# Patient Record
Sex: MALE | Race: WHITE | NOT HISPANIC OR LATINO | Employment: FULL TIME | URBAN - METROPOLITAN AREA
[De-identification: names, ages, dates, MRNs, and addresses within clinical notes are randomized per-mention and may not be internally consistent; named-entity substitution may affect disease eponyms.]

---

## 2021-04-30 ENCOUNTER — HOSPITAL ENCOUNTER (OUTPATIENT)
Dept: LAB | Facility: MEDICAL CENTER | Age: 61
End: 2021-04-30
Attending: FAMILY MEDICINE
Payer: COMMERCIAL

## 2021-04-30 LAB
ALBUMIN SERPL BCP-MCNC: 4.2 G/DL (ref 3.2–4.9)
ALBUMIN/GLOB SERPL: 1.4 G/DL
ALP SERPL-CCNC: 60 U/L (ref 30–99)
ALT SERPL-CCNC: 57 U/L (ref 2–50)
ANION GAP SERPL CALC-SCNC: 8 MMOL/L (ref 7–16)
AST SERPL-CCNC: 31 U/L (ref 12–45)
BASOPHILS # BLD AUTO: 0.8 % (ref 0–1.8)
BASOPHILS # BLD: 0.06 K/UL (ref 0–0.12)
BILIRUB SERPL-MCNC: 0.8 MG/DL (ref 0.1–1.5)
BUN SERPL-MCNC: 13 MG/DL (ref 8–22)
CALCIUM SERPL-MCNC: 9.4 MG/DL (ref 8.5–10.5)
CHLORIDE SERPL-SCNC: 102 MMOL/L (ref 96–112)
CHOLEST SERPL-MCNC: 212 MG/DL (ref 100–199)
CO2 SERPL-SCNC: 27 MMOL/L (ref 20–33)
CREAT SERPL-MCNC: 0.98 MG/DL (ref 0.5–1.4)
EOSINOPHIL # BLD AUTO: 0.2 K/UL (ref 0–0.51)
EOSINOPHIL NFR BLD: 2.6 % (ref 0–6.9)
ERYTHROCYTE [DISTWIDTH] IN BLOOD BY AUTOMATED COUNT: 42.6 FL (ref 35.9–50)
EST. AVERAGE GLUCOSE BLD GHB EST-MCNC: 137 MG/DL
FASTING STATUS PATIENT QL REPORTED: NORMAL
GLOBULIN SER CALC-MCNC: 3.1 G/DL (ref 1.9–3.5)
GLUCOSE SERPL-MCNC: 155 MG/DL (ref 65–99)
HBA1C MFR BLD: 6.4 % (ref 4–5.6)
HCT VFR BLD AUTO: 50.8 % (ref 42–52)
HDLC SERPL-MCNC: 44 MG/DL
HGB BLD-MCNC: 16.7 G/DL (ref 14–18)
IMM GRANULOCYTES # BLD AUTO: 0.08 K/UL (ref 0–0.11)
IMM GRANULOCYTES NFR BLD AUTO: 1 % (ref 0–0.9)
LDLC SERPL CALC-MCNC: 145 MG/DL
LYMPHOCYTES # BLD AUTO: 2.43 K/UL (ref 1–4.8)
LYMPHOCYTES NFR BLD: 31.4 % (ref 22–41)
MCH RBC QN AUTO: 29 PG (ref 27–33)
MCHC RBC AUTO-ENTMCNC: 32.9 G/DL (ref 33.7–35.3)
MCV RBC AUTO: 88.2 FL (ref 81.4–97.8)
MONOCYTES # BLD AUTO: 0.73 K/UL (ref 0–0.85)
MONOCYTES NFR BLD AUTO: 9.4 % (ref 0–13.4)
NEUTROPHILS # BLD AUTO: 4.24 K/UL (ref 1.82–7.42)
NEUTROPHILS NFR BLD: 54.8 % (ref 44–72)
NRBC # BLD AUTO: 0 K/UL
NRBC BLD-RTO: 0 /100 WBC
PLATELET # BLD AUTO: 282 K/UL (ref 164–446)
PMV BLD AUTO: 10.5 FL (ref 9–12.9)
POTASSIUM SERPL-SCNC: 4.6 MMOL/L (ref 3.6–5.5)
PROT SERPL-MCNC: 7.3 G/DL (ref 6–8.2)
PSA SERPL-MCNC: 0.57 NG/ML (ref 0–4)
RBC # BLD AUTO: 5.76 M/UL (ref 4.7–6.1)
SODIUM SERPL-SCNC: 137 MMOL/L (ref 135–145)
TRIGL SERPL-MCNC: 113 MG/DL (ref 0–149)
TSH SERPL DL<=0.005 MIU/L-ACNC: 2.04 UIU/ML (ref 0.38–5.33)
WBC # BLD AUTO: 7.7 K/UL (ref 4.8–10.8)

## 2021-04-30 PROCEDURE — 80061 LIPID PANEL: CPT

## 2021-04-30 PROCEDURE — 83036 HEMOGLOBIN GLYCOSYLATED A1C: CPT

## 2021-04-30 PROCEDURE — 84443 ASSAY THYROID STIM HORMONE: CPT

## 2021-04-30 PROCEDURE — 84153 ASSAY OF PSA TOTAL: CPT

## 2021-04-30 PROCEDURE — 80053 COMPREHEN METABOLIC PANEL: CPT

## 2021-04-30 PROCEDURE — 36415 COLL VENOUS BLD VENIPUNCTURE: CPT

## 2021-04-30 PROCEDURE — 85025 COMPLETE CBC W/AUTO DIFF WBC: CPT

## 2021-07-26 ENCOUNTER — HOSPITAL ENCOUNTER (OUTPATIENT)
Dept: RADIOLOGY | Facility: MEDICAL CENTER | Age: 61
End: 2021-07-26
Attending: INTERNAL MEDICINE
Payer: COMMERCIAL

## 2021-07-26 DIAGNOSIS — Z12.11 COLON CANCER SCREENING: ICD-10-CM

## 2021-07-26 PROCEDURE — 700117 HCHG RX CONTRAST REV CODE 255: Performed by: INTERNAL MEDICINE

## 2021-07-26 PROCEDURE — 72197 MRI PELVIS W/O & W/DYE: CPT

## 2021-07-26 PROCEDURE — A9576 INJ PROHANCE MULTIPACK: HCPCS | Performed by: INTERNAL MEDICINE

## 2021-07-26 RX ADMIN — GADOTERIDOL 20 ML: 279.3 INJECTION, SOLUTION INTRAVENOUS at 16:37

## 2021-08-04 ENCOUNTER — HOSPITAL ENCOUNTER (OUTPATIENT)
Dept: LAB | Facility: MEDICAL CENTER | Age: 61
End: 2021-08-04
Attending: SURGERY
Payer: COMMERCIAL

## 2021-08-04 LAB
ALBUMIN SERPL BCP-MCNC: 4.5 G/DL (ref 3.2–4.9)
ALBUMIN/GLOB SERPL: 1.5 G/DL
ALP SERPL-CCNC: 63 U/L (ref 30–99)
ALT SERPL-CCNC: 25 U/L (ref 2–50)
ANION GAP SERPL CALC-SCNC: 13 MMOL/L (ref 7–16)
AST SERPL-CCNC: 17 U/L (ref 12–45)
BASOPHILS # BLD AUTO: 0.7 % (ref 0–1.8)
BASOPHILS # BLD: 0.05 K/UL (ref 0–0.12)
BILIRUB SERPL-MCNC: 0.7 MG/DL (ref 0.1–1.5)
BUN SERPL-MCNC: 12 MG/DL (ref 8–22)
CALCIUM SERPL-MCNC: 10 MG/DL (ref 8.5–10.5)
CEA SERPL-MCNC: 2.9 NG/ML (ref 0–3)
CHLORIDE SERPL-SCNC: 103 MMOL/L (ref 96–112)
CO2 SERPL-SCNC: 24 MMOL/L (ref 20–33)
CREAT SERPL-MCNC: 1.05 MG/DL (ref 0.5–1.4)
EOSINOPHIL # BLD AUTO: 0.14 K/UL (ref 0–0.51)
EOSINOPHIL NFR BLD: 2 % (ref 0–6.9)
ERYTHROCYTE [DISTWIDTH] IN BLOOD BY AUTOMATED COUNT: 43.6 FL (ref 35.9–50)
FASTING STATUS PATIENT QL REPORTED: NORMAL
GLOBULIN SER CALC-MCNC: 3 G/DL (ref 1.9–3.5)
GLUCOSE SERPL-MCNC: 139 MG/DL (ref 65–99)
HCT VFR BLD AUTO: 50.1 % (ref 42–52)
HGB BLD-MCNC: 16.5 G/DL (ref 14–18)
IMM GRANULOCYTES # BLD AUTO: 0.04 K/UL (ref 0–0.11)
IMM GRANULOCYTES NFR BLD AUTO: 0.6 % (ref 0–0.9)
LYMPHOCYTES # BLD AUTO: 1.7 K/UL (ref 1–4.8)
LYMPHOCYTES NFR BLD: 24.1 % (ref 22–41)
MCH RBC QN AUTO: 28.9 PG (ref 27–33)
MCHC RBC AUTO-ENTMCNC: 32.9 G/DL (ref 33.7–35.3)
MCV RBC AUTO: 87.7 FL (ref 81.4–97.8)
MONOCYTES # BLD AUTO: 0.66 K/UL (ref 0–0.85)
MONOCYTES NFR BLD AUTO: 9.4 % (ref 0–13.4)
NEUTROPHILS # BLD AUTO: 4.45 K/UL (ref 1.82–7.42)
NEUTROPHILS NFR BLD: 63.2 % (ref 44–72)
NRBC # BLD AUTO: 0 K/UL
NRBC BLD-RTO: 0 /100 WBC
PLATELET # BLD AUTO: 279 K/UL (ref 164–446)
PMV BLD AUTO: 10.1 FL (ref 9–12.9)
POTASSIUM SERPL-SCNC: 4.8 MMOL/L (ref 3.6–5.5)
PROT SERPL-MCNC: 7.5 G/DL (ref 6–8.2)
RBC # BLD AUTO: 5.71 M/UL (ref 4.7–6.1)
SODIUM SERPL-SCNC: 140 MMOL/L (ref 135–145)
WBC # BLD AUTO: 7 K/UL (ref 4.8–10.8)

## 2021-08-04 PROCEDURE — 85025 COMPLETE CBC W/AUTO DIFF WBC: CPT

## 2021-08-04 PROCEDURE — 82378 CARCINOEMBRYONIC ANTIGEN: CPT

## 2021-08-04 PROCEDURE — 36415 COLL VENOUS BLD VENIPUNCTURE: CPT

## 2021-08-04 PROCEDURE — 80053 COMPREHEN METABOLIC PANEL: CPT

## 2021-08-07 ENCOUNTER — HOSPITAL ENCOUNTER (OUTPATIENT)
Dept: RADIOLOGY | Facility: MEDICAL CENTER | Age: 61
End: 2021-08-07
Attending: SURGERY
Payer: COMMERCIAL

## 2021-08-07 DIAGNOSIS — C18.9 MALIGNANT NEOPLASM OF COLON, UNSPECIFIED PART OF COLON (HCC): ICD-10-CM

## 2021-08-07 PROCEDURE — 700117 HCHG RX CONTRAST REV CODE 255: Performed by: SURGERY

## 2021-08-07 PROCEDURE — 71260 CT THORAX DX C+: CPT

## 2021-08-07 RX ADMIN — IOHEXOL 25 ML: 240 INJECTION, SOLUTION INTRATHECAL; INTRAVASCULAR; INTRAVENOUS; ORAL at 14:44

## 2021-08-07 RX ADMIN — IOHEXOL 100 ML: 350 INJECTION, SOLUTION INTRAVENOUS at 14:44

## 2021-08-20 ENCOUNTER — APPOINTMENT (OUTPATIENT)
Dept: RADIATION ONCOLOGY | Facility: MEDICAL CENTER | Age: 61
End: 2021-08-20
Attending: RADIOLOGY
Payer: COMMERCIAL

## 2021-09-08 ENCOUNTER — PRE-ADMISSION TESTING (OUTPATIENT)
Dept: ADMISSIONS | Facility: MEDICAL CENTER | Age: 61
DRG: 331 | End: 2021-09-08
Attending: SURGERY
Payer: COMMERCIAL

## 2021-09-08 DIAGNOSIS — Z01.810 PRE-OPERATIVE CARDIOVASCULAR EXAMINATION: ICD-10-CM

## 2021-09-08 DIAGNOSIS — Z01.812 PRE-OPERATIVE LABORATORY EXAMINATION: ICD-10-CM

## 2021-09-08 LAB
ANION GAP SERPL CALC-SCNC: 13 MMOL/L (ref 7–16)
BUN SERPL-MCNC: 12 MG/DL (ref 8–22)
CALCIUM SERPL-MCNC: 9.9 MG/DL (ref 8.5–10.5)
CHLORIDE SERPL-SCNC: 102 MMOL/L (ref 96–112)
CO2 SERPL-SCNC: 24 MMOL/L (ref 20–33)
CREAT SERPL-MCNC: 0.89 MG/DL (ref 0.5–1.4)
EKG IMPRESSION: NORMAL
ERYTHROCYTE [DISTWIDTH] IN BLOOD BY AUTOMATED COUNT: 41.8 FL (ref 35.9–50)
GLUCOSE SERPL-MCNC: 119 MG/DL (ref 65–99)
HCT VFR BLD AUTO: 49.3 % (ref 42–52)
HGB BLD-MCNC: 16.5 G/DL (ref 14–18)
MCH RBC QN AUTO: 28.5 PG (ref 27–33)
MCHC RBC AUTO-ENTMCNC: 33.5 G/DL (ref 33.7–35.3)
MCV RBC AUTO: 85.1 FL (ref 81.4–97.8)
PLATELET # BLD AUTO: 288 K/UL (ref 164–446)
PMV BLD AUTO: 9.8 FL (ref 9–12.9)
POTASSIUM SERPL-SCNC: 4.8 MMOL/L (ref 3.6–5.5)
RBC # BLD AUTO: 5.79 M/UL (ref 4.7–6.1)
SODIUM SERPL-SCNC: 139 MMOL/L (ref 135–145)
WBC # BLD AUTO: 7.8 K/UL (ref 4.8–10.8)

## 2021-09-08 PROCEDURE — 93005 ELECTROCARDIOGRAM TRACING: CPT

## 2021-09-08 PROCEDURE — 80048 BASIC METABOLIC PNL TOTAL CA: CPT

## 2021-09-08 PROCEDURE — 93010 ELECTROCARDIOGRAM REPORT: CPT | Performed by: INTERNAL MEDICINE

## 2021-09-08 PROCEDURE — 36415 COLL VENOUS BLD VENIPUNCTURE: CPT

## 2021-09-08 PROCEDURE — 85027 COMPLETE CBC AUTOMATED: CPT

## 2021-09-08 RX ORDER — MULTIVIT-MIN/IRON/FOLIC ACID/K 18-600-40
2000 CAPSULE ORAL EVERY MORNING
COMMUNITY

## 2021-09-08 ASSESSMENT — FIBROSIS 4 INDEX: FIB4 SCORE: 0.74

## 2021-09-17 ENCOUNTER — PRE-ADMISSION TESTING (OUTPATIENT)
Dept: ADMISSIONS | Facility: MEDICAL CENTER | Age: 61
DRG: 331 | End: 2021-09-17
Attending: SURGERY
Payer: COMMERCIAL

## 2021-09-17 DIAGNOSIS — Z01.812 PRE-OPERATIVE LABORATORY EXAMINATION: ICD-10-CM

## 2021-09-17 LAB — COVID ORDER STATUS COVID19: NORMAL

## 2021-09-17 PROCEDURE — U0003 INFECTIOUS AGENT DETECTION BY NUCLEIC ACID (DNA OR RNA); SEVERE ACUTE RESPIRATORY SYNDROME CORONAVIRUS 2 (SARS-COV-2) (CORONAVIRUS DISEASE [COVID-19]), AMPLIFIED PROBE TECHNIQUE, MAKING USE OF HIGH THROUGHPUT TECHNOLOGIES AS DESCRIBED BY CMS-2020-01-R: HCPCS

## 2021-09-17 PROCEDURE — U0005 INFEC AGEN DETEC AMPLI PROBE: HCPCS

## 2021-09-19 LAB
SARS-COV-2 RNA RESP QL NAA+PROBE: NOTDETECTED
SPECIMEN SOURCE: NORMAL

## 2021-09-20 PROBLEM — C19 RECTOSIGMOID CANCER (HCC): Chronic | Status: ACTIVE | Noted: 2021-09-20

## 2021-09-21 ENCOUNTER — ANESTHESIA (OUTPATIENT)
Dept: SURGERY | Facility: MEDICAL CENTER | Age: 61
DRG: 331 | End: 2021-09-21
Payer: COMMERCIAL

## 2021-09-21 ENCOUNTER — ANESTHESIA EVENT (OUTPATIENT)
Dept: SURGERY | Facility: MEDICAL CENTER | Age: 61
DRG: 331 | End: 2021-09-21
Payer: COMMERCIAL

## 2021-09-21 ENCOUNTER — HOSPITAL ENCOUNTER (INPATIENT)
Facility: MEDICAL CENTER | Age: 61
LOS: 4 days | DRG: 331 | End: 2021-09-25
Attending: SURGERY | Admitting: SURGERY
Payer: COMMERCIAL

## 2021-09-21 DIAGNOSIS — G89.18 POST-OP PAIN: ICD-10-CM

## 2021-09-21 DIAGNOSIS — Z43.2 ILEOSTOMY CARE (HCC): ICD-10-CM

## 2021-09-21 DIAGNOSIS — C19 RECTOSIGMOID CANCER (HCC): Chronic | ICD-10-CM

## 2021-09-21 LAB — GLUCOSE BLD-MCNC: 125 MG/DL (ref 65–99)

## 2021-09-21 PROCEDURE — 07BC4ZX EXCISION OF PELVIS LYMPHATIC, PERCUTANEOUS ENDOSCOPIC APPROACH, DIAGNOSTIC: ICD-10-PCS | Performed by: SURGERY

## 2021-09-21 PROCEDURE — 0DTN4ZZ RESECTION OF SIGMOID COLON, PERCUTANEOUS ENDOSCOPIC APPROACH: ICD-10-PCS | Performed by: SURGERY

## 2021-09-21 PROCEDURE — 160042 HCHG SURGERY MINUTES - EA ADDL 1 MIN LEVEL 5: Performed by: SURGERY

## 2021-09-21 PROCEDURE — 88307 TISSUE EXAM BY PATHOLOGIST: CPT

## 2021-09-21 PROCEDURE — 160009 HCHG ANES TIME/MIN: Performed by: SURGERY

## 2021-09-21 PROCEDURE — 502714 HCHG ROBOTIC SURGERY SERVICES: Performed by: SURGERY

## 2021-09-21 PROCEDURE — 160002 HCHG RECOVERY MINUTES (STAT): Performed by: SURGERY

## 2021-09-21 PROCEDURE — 770006 HCHG ROOM/CARE - MED/SURG/GYN SEMI*

## 2021-09-21 PROCEDURE — 0D1B0Z4 BYPASS ILEUM TO CUTANEOUS, OPEN APPROACH: ICD-10-PCS | Performed by: SURGERY

## 2021-09-21 PROCEDURE — 82962 GLUCOSE BLOOD TEST: CPT

## 2021-09-21 PROCEDURE — 700105 HCHG RX REV CODE 258: Performed by: SURGERY

## 2021-09-21 PROCEDURE — A9270 NON-COVERED ITEM OR SERVICE: HCPCS | Performed by: ANESTHESIOLOGY

## 2021-09-21 PROCEDURE — 0DTP4ZZ RESECTION OF RECTUM, PERCUTANEOUS ENDOSCOPIC APPROACH: ICD-10-PCS | Performed by: SURGERY

## 2021-09-21 PROCEDURE — 700102 HCHG RX REV CODE 250 W/ 637 OVERRIDE(OP): Performed by: ANESTHESIOLOGY

## 2021-09-21 PROCEDURE — 700102 HCHG RX REV CODE 250 W/ 637 OVERRIDE(OP): Performed by: NURSE PRACTITIONER

## 2021-09-21 PROCEDURE — 160031 HCHG SURGERY MINUTES - 1ST 30 MINS LEVEL 5: Performed by: SURGERY

## 2021-09-21 PROCEDURE — 700104 HCHG RX REV CODE 254: Performed by: ANESTHESIOLOGY

## 2021-09-21 PROCEDURE — 160035 HCHG PACU - 1ST 60 MINS PHASE I: Performed by: SURGERY

## 2021-09-21 PROCEDURE — 8E0W4CZ ROBOTIC ASSISTED PROCEDURE OF TRUNK REGION, PERCUTANEOUS ENDOSCOPIC APPROACH: ICD-10-PCS | Performed by: SURGERY

## 2021-09-21 PROCEDURE — 88304 TISSUE EXAM BY PATHOLOGIST: CPT

## 2021-09-21 PROCEDURE — A9270 NON-COVERED ITEM OR SERVICE: HCPCS | Performed by: NURSE PRACTITIONER

## 2021-09-21 PROCEDURE — 64488 TAP BLOCK BI INJECTION: CPT | Performed by: SURGERY

## 2021-09-21 PROCEDURE — 700111 HCHG RX REV CODE 636 W/ 250 OVERRIDE (IP): Performed by: ANESTHESIOLOGY

## 2021-09-21 PROCEDURE — 500380 HCHG DRAIN, PENROSE 1/4X12: Performed by: SURGERY

## 2021-09-21 PROCEDURE — 501570 HCHG TROCAR, SEPARATOR: Performed by: SURGERY

## 2021-09-21 PROCEDURE — 700101 HCHG RX REV CODE 250: Performed by: ANESTHESIOLOGY

## 2021-09-21 PROCEDURE — C1765 ADHESION BARRIER: HCPCS | Performed by: SURGERY

## 2021-09-21 PROCEDURE — 700105 HCHG RX REV CODE 258: Performed by: NURSE PRACTITIONER

## 2021-09-21 PROCEDURE — 160048 HCHG OR STATISTICAL LEVEL 1-5: Performed by: SURGERY

## 2021-09-21 PROCEDURE — 500378 HCHG DRAIN, J-VAC ROUND 19FR: Performed by: SURGERY

## 2021-09-21 PROCEDURE — 501838 HCHG SUTURE GENERAL: Performed by: SURGERY

## 2021-09-21 RX ORDER — HYDROMORPHONE HYDROCHLORIDE 1 MG/ML
0.1 INJECTION, SOLUTION INTRAMUSCULAR; INTRAVENOUS; SUBCUTANEOUS
Status: DISCONTINUED | OUTPATIENT
Start: 2021-09-21 | End: 2021-09-21 | Stop reason: HOSPADM

## 2021-09-21 RX ORDER — ACETAMINOPHEN 500 MG
1000 TABLET ORAL ONCE
Status: COMPLETED | OUTPATIENT
Start: 2021-09-21 | End: 2021-09-21

## 2021-09-21 RX ORDER — ONDANSETRON 2 MG/ML
INJECTION INTRAMUSCULAR; INTRAVENOUS PRN
Status: DISCONTINUED | OUTPATIENT
Start: 2021-09-21 | End: 2021-09-21 | Stop reason: SURG

## 2021-09-21 RX ORDER — DIPHENHYDRAMINE HYDROCHLORIDE 50 MG/ML
25 INJECTION INTRAMUSCULAR; INTRAVENOUS EVERY 6 HOURS PRN
Status: DISCONTINUED | OUTPATIENT
Start: 2021-09-21 | End: 2021-09-25 | Stop reason: HOSPADM

## 2021-09-21 RX ORDER — OXYCODONE HYDROCHLORIDE 5 MG/1
5 TABLET ORAL
Status: DISCONTINUED | OUTPATIENT
Start: 2021-09-21 | End: 2021-09-25 | Stop reason: HOSPADM

## 2021-09-21 RX ORDER — ACETAMINOPHEN 500 MG
1000 TABLET ORAL EVERY 6 HOURS PRN
Status: DISCONTINUED | OUTPATIENT
Start: 2021-09-26 | End: 2021-09-25 | Stop reason: HOSPADM

## 2021-09-21 RX ORDER — SODIUM CHLORIDE, SODIUM LACTATE, POTASSIUM CHLORIDE, CALCIUM CHLORIDE 600; 310; 30; 20 MG/100ML; MG/100ML; MG/100ML; MG/100ML
INJECTION, SOLUTION INTRAVENOUS
Status: COMPLETED | OUTPATIENT
Start: 2021-09-21 | End: 2021-09-21

## 2021-09-21 RX ORDER — LIDOCAINE HYDROCHLORIDE 20 MG/ML
INJECTION, SOLUTION EPIDURAL; INFILTRATION; INTRACAUDAL; PERINEURAL PRN
Status: DISCONTINUED | OUTPATIENT
Start: 2021-09-21 | End: 2021-09-21 | Stop reason: SURG

## 2021-09-21 RX ORDER — HALOPERIDOL 5 MG/ML
1 INJECTION INTRAMUSCULAR EVERY 6 HOURS PRN
Status: DISCONTINUED | OUTPATIENT
Start: 2021-09-21 | End: 2021-09-25 | Stop reason: HOSPADM

## 2021-09-21 RX ORDER — HYDROMORPHONE HYDROCHLORIDE 1 MG/ML
0.5 INJECTION, SOLUTION INTRAMUSCULAR; INTRAVENOUS; SUBCUTANEOUS
Status: DISCONTINUED | OUTPATIENT
Start: 2021-09-21 | End: 2021-09-25 | Stop reason: HOSPADM

## 2021-09-21 RX ORDER — MEPERIDINE HYDROCHLORIDE 25 MG/ML
6.25 INJECTION INTRAMUSCULAR; INTRAVENOUS; SUBCUTANEOUS
Status: DISCONTINUED | OUTPATIENT
Start: 2021-09-21 | End: 2021-09-21 | Stop reason: HOSPADM

## 2021-09-21 RX ORDER — HYDROMORPHONE HYDROCHLORIDE 1 MG/ML
0.2 INJECTION, SOLUTION INTRAMUSCULAR; INTRAVENOUS; SUBCUTANEOUS
Status: DISCONTINUED | OUTPATIENT
Start: 2021-09-21 | End: 2021-09-21 | Stop reason: HOSPADM

## 2021-09-21 RX ORDER — DEXTROSE, SODIUM CHLORIDE, SODIUM LACTATE, POTASSIUM CHLORIDE, AND CALCIUM CHLORIDE 5; .6; .31; .03; .02 G/100ML; G/100ML; G/100ML; G/100ML; G/100ML
INJECTION, SOLUTION INTRAVENOUS CONTINUOUS
Status: ACTIVE | OUTPATIENT
Start: 2021-09-21 | End: 2021-09-22

## 2021-09-21 RX ORDER — HYDROMORPHONE HYDROCHLORIDE 2 MG/ML
INJECTION, SOLUTION INTRAMUSCULAR; INTRAVENOUS; SUBCUTANEOUS PRN
Status: DISCONTINUED | OUTPATIENT
Start: 2021-09-21 | End: 2021-09-21 | Stop reason: SURG

## 2021-09-21 RX ORDER — HYDROMORPHONE HYDROCHLORIDE 1 MG/ML
0.4 INJECTION, SOLUTION INTRAMUSCULAR; INTRAVENOUS; SUBCUTANEOUS
Status: DISCONTINUED | OUTPATIENT
Start: 2021-09-21 | End: 2021-09-21 | Stop reason: HOSPADM

## 2021-09-21 RX ORDER — OMEPRAZOLE 20 MG/1
20 CAPSULE, DELAYED RELEASE ORAL EVERY MORNING
COMMUNITY

## 2021-09-21 RX ORDER — NEOMYCIN SULFATE 500 MG/1
1000 TABLET ORAL 3 TIMES DAILY
Status: ON HOLD | COMMUNITY
End: 2021-09-25

## 2021-09-21 RX ORDER — METOCLOPRAMIDE HYDROCHLORIDE 5 MG/ML
INJECTION INTRAMUSCULAR; INTRAVENOUS PRN
Status: DISCONTINUED | OUTPATIENT
Start: 2021-09-21 | End: 2021-09-21 | Stop reason: SURG

## 2021-09-21 RX ORDER — CALCIUM CARBONATE 500 MG/1
500 TABLET, CHEWABLE ORAL
Status: DISCONTINUED | OUTPATIENT
Start: 2021-09-21 | End: 2021-09-25 | Stop reason: HOSPADM

## 2021-09-21 RX ORDER — SODIUM CHLORIDE, SODIUM LACTATE, POTASSIUM CHLORIDE, CALCIUM CHLORIDE 600; 310; 30; 20 MG/100ML; MG/100ML; MG/100ML; MG/100ML
INJECTION, SOLUTION INTRAVENOUS CONTINUOUS
Status: ACTIVE | OUTPATIENT
Start: 2021-09-21 | End: 2021-09-21

## 2021-09-21 RX ORDER — METOPROLOL TARTRATE 1 MG/ML
1 INJECTION, SOLUTION INTRAVENOUS
Status: DISCONTINUED | OUTPATIENT
Start: 2021-09-21 | End: 2021-09-21 | Stop reason: HOSPADM

## 2021-09-21 RX ORDER — ONDANSETRON 2 MG/ML
4 INJECTION INTRAMUSCULAR; INTRAVENOUS EVERY 4 HOURS PRN
Status: DISCONTINUED | OUTPATIENT
Start: 2021-09-21 | End: 2021-09-25 | Stop reason: HOSPADM

## 2021-09-21 RX ORDER — OXYCODONE HYDROCHLORIDE 10 MG/1
10 TABLET ORAL
Status: DISCONTINUED | OUTPATIENT
Start: 2021-09-21 | End: 2021-09-25 | Stop reason: HOSPADM

## 2021-09-21 RX ORDER — HYDRALAZINE HYDROCHLORIDE 20 MG/ML
5 INJECTION INTRAMUSCULAR; INTRAVENOUS
Status: DISCONTINUED | OUTPATIENT
Start: 2021-09-21 | End: 2021-09-21 | Stop reason: HOSPADM

## 2021-09-21 RX ORDER — BUPIVACAINE HYDROCHLORIDE AND EPINEPHRINE 2.5; 5 MG/ML; UG/ML
INJECTION, SOLUTION EPIDURAL; INFILTRATION; INTRACAUDAL; PERINEURAL PRN
Status: DISCONTINUED | OUTPATIENT
Start: 2021-09-21 | End: 2021-09-21 | Stop reason: SURG

## 2021-09-21 RX ORDER — ROCURONIUM BROMIDE 10 MG/ML
INJECTION, SOLUTION INTRAVENOUS PRN
Status: DISCONTINUED | OUTPATIENT
Start: 2021-09-21 | End: 2021-09-21 | Stop reason: SURG

## 2021-09-21 RX ORDER — HALOPERIDOL 5 MG/ML
1 INJECTION INTRAMUSCULAR
Status: DISCONTINUED | OUTPATIENT
Start: 2021-09-21 | End: 2021-09-21 | Stop reason: HOSPADM

## 2021-09-21 RX ORDER — LORATADINE 10 MG/1
10 TABLET ORAL
COMMUNITY

## 2021-09-21 RX ORDER — SUCCINYLCHOLINE CHLORIDE 20 MG/ML
INJECTION INTRAMUSCULAR; INTRAVENOUS PRN
Status: DISCONTINUED | OUTPATIENT
Start: 2021-09-21 | End: 2021-09-21 | Stop reason: SURG

## 2021-09-21 RX ORDER — SODIUM CHLORIDE, SODIUM LACTATE, POTASSIUM CHLORIDE, CALCIUM CHLORIDE 600; 310; 30; 20 MG/100ML; MG/100ML; MG/100ML; MG/100ML
INJECTION, SOLUTION INTRAVENOUS CONTINUOUS
Status: DISCONTINUED | OUTPATIENT
Start: 2021-09-21 | End: 2021-09-21 | Stop reason: HOSPADM

## 2021-09-21 RX ORDER — MIDAZOLAM HYDROCHLORIDE 1 MG/ML
INJECTION INTRAMUSCULAR; INTRAVENOUS PRN
Status: DISCONTINUED | OUTPATIENT
Start: 2021-09-21 | End: 2021-09-21 | Stop reason: SURG

## 2021-09-21 RX ORDER — DIPHENHYDRAMINE HYDROCHLORIDE 50 MG/ML
12.5 INJECTION INTRAMUSCULAR; INTRAVENOUS
Status: DISCONTINUED | OUTPATIENT
Start: 2021-09-21 | End: 2021-09-21 | Stop reason: HOSPADM

## 2021-09-21 RX ORDER — OMEPRAZOLE 20 MG/1
20 CAPSULE, DELAYED RELEASE ORAL DAILY
Status: DISCONTINUED | OUTPATIENT
Start: 2021-09-22 | End: 2021-09-25 | Stop reason: HOSPADM

## 2021-09-21 RX ORDER — GABAPENTIN 300 MG/1
300 CAPSULE ORAL ONCE
Status: COMPLETED | OUTPATIENT
Start: 2021-09-21 | End: 2021-09-21

## 2021-09-21 RX ORDER — TRAZODONE HYDROCHLORIDE 50 MG/1
50 TABLET ORAL NIGHTLY PRN
Status: DISCONTINUED | OUTPATIENT
Start: 2021-09-21 | End: 2021-09-25 | Stop reason: HOSPADM

## 2021-09-21 RX ORDER — METOPROLOL TARTRATE 1 MG/ML
INJECTION, SOLUTION INTRAVENOUS PRN
Status: DISCONTINUED | OUTPATIENT
Start: 2021-09-21 | End: 2021-09-21 | Stop reason: SURG

## 2021-09-21 RX ORDER — INDOCYANINE GREEN AND WATER 25 MG
KIT INJECTION PRN
Status: DISCONTINUED | OUTPATIENT
Start: 2021-09-21 | End: 2021-09-21 | Stop reason: SURG

## 2021-09-21 RX ORDER — DIPHENHYDRAMINE HCL 25 MG
25 TABLET ORAL EVERY 6 HOURS PRN
Status: DISCONTINUED | OUTPATIENT
Start: 2021-09-21 | End: 2021-09-25 | Stop reason: HOSPADM

## 2021-09-21 RX ORDER — ONDANSETRON 2 MG/ML
4 INJECTION INTRAMUSCULAR; INTRAVENOUS
Status: DISCONTINUED | OUTPATIENT
Start: 2021-09-21 | End: 2021-09-21 | Stop reason: HOSPADM

## 2021-09-21 RX ORDER — ACETAMINOPHEN 500 MG
1000 TABLET ORAL EVERY 6 HOURS
Status: DISCONTINUED | OUTPATIENT
Start: 2021-09-21 | End: 2021-09-25 | Stop reason: HOSPADM

## 2021-09-21 RX ORDER — LORATADINE 10 MG/1
10 TABLET ORAL DAILY
Status: DISCONTINUED | OUTPATIENT
Start: 2021-09-22 | End: 2021-09-25 | Stop reason: HOSPADM

## 2021-09-21 RX ORDER — OXYCODONE HCL 5 MG/5 ML
10 SOLUTION, ORAL ORAL
Status: COMPLETED | OUTPATIENT
Start: 2021-09-21 | End: 2021-09-21

## 2021-09-21 RX ORDER — DEXAMETHASONE SODIUM PHOSPHATE 4 MG/ML
INJECTION, SOLUTION INTRA-ARTICULAR; INTRALESIONAL; INTRAMUSCULAR; INTRAVENOUS; SOFT TISSUE PRN
Status: DISCONTINUED | OUTPATIENT
Start: 2021-09-21 | End: 2021-09-21 | Stop reason: SURG

## 2021-09-21 RX ORDER — OXYCODONE HCL 5 MG/5 ML
5 SOLUTION, ORAL ORAL
Status: COMPLETED | OUTPATIENT
Start: 2021-09-21 | End: 2021-09-21

## 2021-09-21 RX ORDER — HYDRALAZINE HYDROCHLORIDE 20 MG/ML
INJECTION INTRAMUSCULAR; INTRAVENOUS PRN
Status: DISCONTINUED | OUTPATIENT
Start: 2021-09-21 | End: 2021-09-21 | Stop reason: SURG

## 2021-09-21 RX ORDER — METRONIDAZOLE 500 MG/1
500 TABLET ORAL 3 TIMES DAILY
Status: ON HOLD | COMMUNITY
End: 2021-09-25

## 2021-09-21 RX ORDER — SCOLOPAMINE TRANSDERMAL SYSTEM 1 MG/1
1 PATCH, EXTENDED RELEASE TRANSDERMAL
Status: DISCONTINUED | OUTPATIENT
Start: 2021-09-21 | End: 2021-09-25 | Stop reason: HOSPADM

## 2021-09-21 RX ORDER — CEFOTETAN DISODIUM 2 G/20ML
INJECTION, POWDER, FOR SOLUTION INTRAMUSCULAR; INTRAVENOUS PRN
Status: DISCONTINUED | OUTPATIENT
Start: 2021-09-21 | End: 2021-09-21 | Stop reason: SURG

## 2021-09-21 RX ORDER — DEXAMETHASONE SODIUM PHOSPHATE 4 MG/ML
4 INJECTION, SOLUTION INTRA-ARTICULAR; INTRALESIONAL; INTRAMUSCULAR; INTRAVENOUS; SOFT TISSUE
Status: DISCONTINUED | OUTPATIENT
Start: 2021-09-21 | End: 2021-09-25 | Stop reason: HOSPADM

## 2021-09-21 RX ADMIN — GABAPENTIN 300 MG: 300 CAPSULE ORAL at 11:29

## 2021-09-21 RX ADMIN — DEXAMETHASONE SODIUM PHOSPHATE 8 MG: 4 INJECTION, SOLUTION INTRA-ARTICULAR; INTRALESIONAL; INTRAMUSCULAR; INTRAVENOUS; SOFT TISSUE at 13:33

## 2021-09-21 RX ADMIN — INDOCYANINE GREEN AND WATER 7.5 MG: KIT at 15:03

## 2021-09-21 RX ADMIN — HYDRALAZINE HYDROCHLORIDE 5 MG: 20 INJECTION INTRAMUSCULAR; INTRAVENOUS at 15:46

## 2021-09-21 RX ADMIN — ROCURONIUM BROMIDE 10 MG: 10 INJECTION, SOLUTION INTRAVENOUS at 13:42

## 2021-09-21 RX ADMIN — METOPROLOL TARTRATE 2 MG: 5 INJECTION, SOLUTION INTRAVENOUS at 13:38

## 2021-09-21 RX ADMIN — SODIUM CHLORIDE, POTASSIUM CHLORIDE, SODIUM LACTATE AND CALCIUM CHLORIDE: 600; 310; 30; 20 INJECTION, SOLUTION INTRAVENOUS at 11:30

## 2021-09-21 RX ADMIN — ROCURONIUM BROMIDE 10 MG: 10 INJECTION, SOLUTION INTRAVENOUS at 13:41

## 2021-09-21 RX ADMIN — BUPIVACAINE HYDROCHLORIDE AND EPINEPHRINE 20 ML: 2.5; 5 INJECTION, SOLUTION EPIDURAL; INFILTRATION; INTRACAUDAL; PERINEURAL at 13:07

## 2021-09-21 RX ADMIN — LIDOCAINE HYDROCHLORIDE 50 MG: 20 INJECTION, SOLUTION EPIDURAL; INFILTRATION; INTRACAUDAL at 17:19

## 2021-09-21 RX ADMIN — HYDROMORPHONE HYDROCHLORIDE 0.4 MG: 2 INJECTION, SOLUTION INTRAMUSCULAR; INTRAVENOUS; SUBCUTANEOUS at 15:03

## 2021-09-21 RX ADMIN — FENTANYL CITRATE 25 MCG: 50 INJECTION, SOLUTION INTRAMUSCULAR; INTRAVENOUS at 16:49

## 2021-09-21 RX ADMIN — HYDROMORPHONE HYDROCHLORIDE 0.2 MG: 2 INJECTION, SOLUTION INTRAMUSCULAR; INTRAVENOUS; SUBCUTANEOUS at 16:49

## 2021-09-21 RX ADMIN — ACETAMINOPHEN 1000 MG: 500 TABLET ORAL at 23:22

## 2021-09-21 RX ADMIN — OXYCODONE HYDROCHLORIDE 10 MG: 5 SOLUTION ORAL at 17:46

## 2021-09-21 RX ADMIN — FENTANYL CITRATE 50 MCG: 50 INJECTION, SOLUTION INTRAMUSCULAR; INTRAVENOUS at 13:39

## 2021-09-21 RX ADMIN — PROPOFOL 25 MCG/KG/MIN: 10 INJECTION, EMULSION INTRAVENOUS at 13:20

## 2021-09-21 RX ADMIN — METOCLOPRAMIDE 10 MG: 5 INJECTION, SOLUTION INTRAMUSCULAR; INTRAVENOUS at 13:52

## 2021-09-21 RX ADMIN — ACETAMINOPHEN 1000 MG: 500 TABLET ORAL at 11:29

## 2021-09-21 RX ADMIN — METOPROLOL TARTRATE 2 MG: 5 INJECTION, SOLUTION INTRAVENOUS at 16:01

## 2021-09-21 RX ADMIN — SUGAMMADEX 200 MG: 100 INJECTION, SOLUTION INTRAVENOUS at 17:22

## 2021-09-21 RX ADMIN — ROCURONIUM BROMIDE 3 MG: 10 INJECTION, SOLUTION INTRAVENOUS at 13:00

## 2021-09-21 RX ADMIN — BUPIVACAINE HYDROCHLORIDE AND EPINEPHRINE 20 ML: 2.5; 5 INJECTION, SOLUTION EPIDURAL; INFILTRATION; INTRACAUDAL; PERINEURAL at 13:10

## 2021-09-21 RX ADMIN — ROCURONIUM BROMIDE 47 MG: 10 INJECTION, SOLUTION INTRAVENOUS at 13:06

## 2021-09-21 RX ADMIN — LIDOCAINE HYDROCHLORIDE 50 MG: 20 INJECTION, SOLUTION EPIDURAL; INFILTRATION; INTRACAUDAL at 13:00

## 2021-09-21 RX ADMIN — MIDAZOLAM HYDROCHLORIDE 2 MG: 1 INJECTION, SOLUTION INTRAMUSCULAR; INTRAVENOUS at 12:57

## 2021-09-21 RX ADMIN — SODIUM CHLORIDE, POTASSIUM CHLORIDE, SODIUM LACTATE AND CALCIUM CHLORIDE: 600; 310; 30; 20 INJECTION, SOLUTION INTRAVENOUS at 16:22

## 2021-09-21 RX ADMIN — ROCURONIUM BROMIDE 10 MG: 10 INJECTION, SOLUTION INTRAVENOUS at 14:36

## 2021-09-21 RX ADMIN — ACETAMINOPHEN 1000 MG: 500 TABLET ORAL at 20:26

## 2021-09-21 RX ADMIN — ROCURONIUM BROMIDE 5 MG: 10 INJECTION, SOLUTION INTRAVENOUS at 16:34

## 2021-09-21 RX ADMIN — CEFOTETAN DISODIUM 2 G: 2 INJECTION, POWDER, FOR SOLUTION INTRAMUSCULAR; INTRAVENOUS at 13:18

## 2021-09-21 RX ADMIN — HYDROMORPHONE HYDROCHLORIDE 0.5 MG: 2 INJECTION, SOLUTION INTRAMUSCULAR; INTRAVENOUS; SUBCUTANEOUS at 13:38

## 2021-09-21 RX ADMIN — SUCCINYLCHOLINE CHLORIDE 110 MG: 20 INJECTION, SOLUTION INTRAMUSCULAR; INTRAVENOUS; PARENTERAL at 13:00

## 2021-09-21 RX ADMIN — ROCURONIUM BROMIDE 15 MG: 10 INJECTION, SOLUTION INTRAVENOUS at 15:32

## 2021-09-21 RX ADMIN — PROPOFOL 160 MG: 10 INJECTION, EMULSION INTRAVENOUS at 13:00

## 2021-09-21 RX ADMIN — ROCURONIUM BROMIDE 10 MG: 10 INJECTION, SOLUTION INTRAVENOUS at 14:17

## 2021-09-21 RX ADMIN — HYDROMORPHONE HYDROCHLORIDE 0.5 MG: 2 INJECTION, SOLUTION INTRAMUSCULAR; INTRAVENOUS; SUBCUTANEOUS at 14:17

## 2021-09-21 RX ADMIN — ROCURONIUM BROMIDE 10 MG: 10 INJECTION, SOLUTION INTRAVENOUS at 15:03

## 2021-09-21 RX ADMIN — SODIUM CHLORIDE, SODIUM LACTATE, POTASSIUM CHLORIDE, CALCIUM CHLORIDE AND DEXTROSE MONOHYDRATE: 5; 600; 310; 30; 20 INJECTION, SOLUTION INTRAVENOUS at 20:27

## 2021-09-21 RX ADMIN — FENTANYL CITRATE 25 MCG: 50 INJECTION, SOLUTION INTRAMUSCULAR; INTRAVENOUS at 12:57

## 2021-09-21 RX ADMIN — ONDANSETRON 4 MG: 2 INJECTION INTRAMUSCULAR; INTRAVENOUS at 16:40

## 2021-09-21 RX ADMIN — HYDROMORPHONE HYDROCHLORIDE 0.4 MG: 2 INJECTION, SOLUTION INTRAMUSCULAR; INTRAVENOUS; SUBCUTANEOUS at 16:24

## 2021-09-21 ASSESSMENT — PATIENT HEALTH QUESTIONNAIRE - PHQ9
2. FEELING DOWN, DEPRESSED, IRRITABLE, OR HOPELESS: NOT AT ALL
1. LITTLE INTEREST OR PLEASURE IN DOING THINGS: NOT AT ALL
SUM OF ALL RESPONSES TO PHQ9 QUESTIONS 1 AND 2: 0

## 2021-09-21 ASSESSMENT — COGNITIVE AND FUNCTIONAL STATUS - GENERAL
CLIMB 3 TO 5 STEPS WITH RAILING: A LITTLE
WALKING IN HOSPITAL ROOM: A LITTLE
STANDING UP FROM CHAIR USING ARMS: A LITTLE
DAILY ACTIVITIY SCORE: 23
TOILETING: A LITTLE
MOBILITY SCORE: 19
MOVING FROM LYING ON BACK TO SITTING ON SIDE OF FLAT BED: A LITTLE
MOVING TO AND FROM BED TO CHAIR: A LITTLE
SUGGESTED CMS G CODE MODIFIER MOBILITY: CK
SUGGESTED CMS G CODE MODIFIER DAILY ACTIVITY: CI

## 2021-09-21 ASSESSMENT — FIBROSIS 4 INDEX: FIB4 SCORE: 0.72

## 2021-09-21 ASSESSMENT — PAIN DESCRIPTION - PAIN TYPE
TYPE: SURGICAL PAIN

## 2021-09-21 NOTE — ANESTHESIA PROCEDURE NOTES
Peripheral Block    Date/Time: 9/21/2021 1:06 PM  Performed by: Martin Arguelles M.D.  Authorized by: Martin Arguelles M.D.     Patient Location:  OR  Start Time:  9/21/2021 1:06 PM  End Time:  9/21/2021 1:10 PM  Reason for Block: at surgeon's request and post-op pain management ONLY    patient identified, IV checked, site marked, risks and benefits discussed, surgical consent, monitors and equipment checked, pre-op evaluation and timeout performed    Patient Position:  Supine  Prep: ChloraPrep    Monitoring:  Heart rate, continuous pulse ox and cardiac monitor  Block Region:  Trunk  Trunk - Block Type:  Abdominal plane block - TAP block    Laterality:  Bilateral  Procedures: ultrasound guided  Image captured, interpreted and electronically stored.  Local Infiltration:  Lidocaine  Strength:  1 %  Dose:  3 ml  Block Type:  Single-shot  Needle Length:  100mm  Needle Gauge:  21 G  Needle Localization:  Ultrasound guidance  Injection Assessment:  Negative aspiration for heme, no paresthesia on injection, incremental injection and local visualized surrounding nerve on ultrasound  Evidence of intravascular injection: No     US Guided Transversus Abdominis Plane (TAP) Block   US probe placed at the anterior axillary line between the costal margin and iliac crest in an axial plane. External Oblique, Internal Oblique (IO) and Transversis Abdominis (TA) muscles identified.  Needle inserted anteromedial to probe in an in plane approach and advanced under direct visualization to TAP between IO and TA muscled.  After negative aspiration LA injected with ease and visualized spreading in TAP plane.

## 2021-09-21 NOTE — ANESTHESIA PREPROCEDURE EVALUATION
Some AYDIN  Greater than 4 mets without sxs  Old MI on ECG- surprise to patient, no sxs    Relevant Problems   No relevant active problems       Physical Exam    Airway   Mallampati: II  TM distance: >3 FB  Neck ROM: full       Cardiovascular - normal exam  Rhythm: regular  Rate: normal  (-) murmur     Dental - normal exam           Pulmonary - normal exam  Breath sounds clear to auscultation     Abdominal    Neurological - normal exam                 Anesthesia Plan    ASA 3   ASA physical status 3 criteria: MI or angina - history (> 3 months)    Plan - general and peripheral nerve block     Peripheral nerve block will be post-op pain control  Airway plan will be ETT          Induction: intravenous    Postoperative Plan: Postoperative administration of opioids is intended.    Pertinent diagnostic labs and testing reviewed    Informed Consent:    Anesthetic plan and risks discussed with patient.    Use of blood products discussed with: patient whom consented to blood products.

## 2021-09-21 NOTE — ANESTHESIA PROCEDURE NOTES
Airway    Date/Time: 9/21/2021 1:01 PM  Performed by: Martin Arguelles M.D.  Authorized by: Martin Arguelles M.D.     Location:  OR  Urgency:  Elective  Indications for Airway Management:  Anesthesia      Spontaneous Ventilation: absent    Sedation Level:  Deep  Preoxygenated: Yes    Patient Position:  Sniffing  Final Airway Type:  Endotracheal airway  Final Endotracheal Airway:  ETT  Cuffed: Yes    Technique Used for Successful ETT Placement:  Direct laryngoscopy  Devices/Methods Used in Placement:  Cricoid pressure and intubating stylet    Insertion Site:  Oral  Blade Type:  Glide  Laryngoscope Blade/Videolaryngoscope Blade Size:  3  ETT Size (mm):  8.0  Measured from:  Teeth  ETT to Teeth (cm):  22  Placement Verified by: auscultation and capnometry    Cormack-Lehane Classification:  Grade I - full view of glottis  Number of Attempts at Approach:  1

## 2021-09-21 NOTE — H&P
Surgery General History & Physical Note    Date  9/20/2021    Primary Care Physician  Nesha Matias D.O.    CC  Rectosigmoid cancer    HPI  This is a 61 y.o. male who was found to have a 2.5 cm mass in the rectosigmoid colon was removed piecemeal by  on 7/16/21 confirmed to be well differentiated invasive colonic adenocarcinoma with intact mismatch repair protein expression.  Because the polyp had been resected and surprisingly had findings of adenocarcinoma the patient underwent repeat flexible sigmoidoscopy where the post polypectomy ulcer was identified at 16 cm from anal verge and was tattooed.  2 tattoos were placed immediately distal to the polypectomy site with one tattoo placed immediately proximal.  MRI of the pelvis identified neither evidence of residual tumor nor adenopathy.  The polypectomy site was not identified.patient's CEA was not elevated at 2.9 ng/mL.  Staging CT of the chest abdomen and pelvis demonstrated no evidence of metastatic disease.  There were findings of diverticulosis.    Past Medical History:   Diagnosis Date   • Cancer (HCC) 2021    rectal   • Heart burn    • High cholesterol        Past Surgical History:   Procedure Laterality Date   • TONSILLECTOMY  1978       Current Facility-Administered Medications   Medication Dose Route Frequency Provider Last Rate Last Admin   • lidocaine (XYLOCAINE) 1 % injection 0.5 mL  0.5 mL Intradermal Once PRN Damian Camacho M.D.       • lactated ringers infusion   Intravenous Continuous Damian Camacho M.D. 10 mL/hr at 09/21/21 1130 New Bag at 09/21/21 1130       Social History     Socioeconomic History   • Marital status:      Spouse name: Not on file   • Number of children: Not on file   • Years of education: Not on file   • Highest education level: Not on file   Occupational History   • Not on file   Tobacco Use   • Smoking status: Never Smoker   • Smokeless tobacco: Never Used   Vaping Use   • Vaping Use: Never used    Substance and Sexual Activity   • Alcohol use: Yes     Alcohol/week: 3.6 oz     Types: 3 Glasses of wine, 3 Cans of beer per week     Comment: week    • Drug use: Never   • Sexual activity: Not on file   Other Topics Concern   • Not on file   Social History Narrative   • Not on file     Social Determinants of Health     Financial Resource Strain:    • Difficulty of Paying Living Expenses:    Food Insecurity:    • Worried About Running Out of Food in the Last Year:    • Ran Out of Food in the Last Year:    Transportation Needs:    • Lack of Transportation (Medical):    • Lack of Transportation (Non-Medical):    Physical Activity:    • Days of Exercise per Week:    • Minutes of Exercise per Session:    Stress:    • Feeling of Stress :    Social Connections:    • Frequency of Communication with Friends and Family:    • Frequency of Social Gatherings with Friends and Family:    • Attends Rastafarian Services:    • Active Member of Clubs or Organizations:    • Attends Club or Organization Meetings:    • Marital Status:    Intimate Partner Violence:    • Fear of Current or Ex-Partner:    • Emotionally Abused:    • Physically Abused:    • Sexually Abused:        History reviewed. No pertinent family history.    Allergies  Patient has no known allergies.    Review of Systems  Negative    Physical Exam  Vitals and nursing note reviewed.   HENT:      Head: Normocephalic.      Mouth/Throat:      Mouth: Mucous membranes are moist.   Cardiovascular:      Rate and Rhythm: Normal rate.      Pulses: Normal pulses.   Pulmonary:      Effort: Pulmonary effort is normal.      Breath sounds: Normal breath sounds.   Abdominal:      General: Abdomen is flat.      Palpations: Abdomen is soft.   Musculoskeletal:         General: Normal range of motion.      Cervical back: Normal range of motion.   Skin:     General: Skin is warm and dry.   Neurological:      Mental Status: He is alert and oriented to person, place, and time.   Psychiatric:          Mood and Affect: Mood normal.         Behavior: Behavior normal.         Thought Content: Thought content normal.         Judgment: Judgment normal.         Vital Signs                          Labs:                    Radiology:  No orders to display         Assessment/Plan:  61-year-old male with rectosigmoid cancer  Procedure(s):  RESECTION, LOW ANTERIOR, ROBOT-ASSISTED, LAPAROSCOPIC, USING DA ERIC XI  SIGMOIDOSCOPY  CREATION, ILEOSTOMY - POSSIBLE DIVERTING LOOP   Risks, benefits, and alternatives were discussed with consenting person(s). Consenting person(s) were given an opportunity to ask questions and discuss other options. Risks including but not limited to failed or incomplete planned procedure, ineffective therapy, perforation, infection, bleeding, missed lesion(s), missed diagnosis, cardiac and/or pulmonary event, aspiration, stroke, possible need for surgery, hospitalization possibly prolonged, discomfort, unsuccessful and/or incomplete procedure, possible need for repeat procedures and/or additional testings, damage to adjacent organs and/or vascular structures, medication reaction, disability, death, and other adverse events possibly life-threatening. Discussion was undertaken with Layman's terms. Consenting persons stated understanding and acceptance of these risks, and wished to proceed. Consent was given in clear state of mind.  Damian Camacho MD PhD  [facility]  Colon and Rectal Surgeon  [phone]

## 2021-09-22 LAB
ANION GAP SERPL CALC-SCNC: 12 MMOL/L (ref 7–16)
BUN SERPL-MCNC: 7 MG/DL (ref 8–22)
CALCIUM SERPL-MCNC: 8.8 MG/DL (ref 8.5–10.5)
CHLORIDE SERPL-SCNC: 101 MMOL/L (ref 96–112)
CO2 SERPL-SCNC: 23 MMOL/L (ref 20–33)
CREAT SERPL-MCNC: 0.74 MG/DL (ref 0.5–1.4)
ERYTHROCYTE [DISTWIDTH] IN BLOOD BY AUTOMATED COUNT: 42.2 FL (ref 35.9–50)
GLUCOSE SERPL-MCNC: 197 MG/DL (ref 65–99)
HCT VFR BLD AUTO: 44.7 % (ref 42–52)
HGB BLD-MCNC: 14.9 G/DL (ref 14–18)
MAGNESIUM SERPL-MCNC: 1.9 MG/DL (ref 1.5–2.5)
MCH RBC QN AUTO: 28.6 PG (ref 27–33)
MCHC RBC AUTO-ENTMCNC: 33.3 G/DL (ref 33.7–35.3)
MCV RBC AUTO: 85.8 FL (ref 81.4–97.8)
PATHOLOGY CONSULT NOTE: NORMAL
PLATELET # BLD AUTO: 276 K/UL (ref 164–446)
PMV BLD AUTO: 9.9 FL (ref 9–12.9)
POTASSIUM SERPL-SCNC: 4.1 MMOL/L (ref 3.6–5.5)
RBC # BLD AUTO: 5.21 M/UL (ref 4.7–6.1)
SODIUM SERPL-SCNC: 136 MMOL/L (ref 135–145)
WBC # BLD AUTO: 11.2 K/UL (ref 4.8–10.8)

## 2021-09-22 PROCEDURE — 97602 WOUND(S) CARE NON-SELECTIVE: CPT

## 2021-09-22 PROCEDURE — 85027 COMPLETE CBC AUTOMATED: CPT

## 2021-09-22 PROCEDURE — 700102 HCHG RX REV CODE 250 W/ 637 OVERRIDE(OP): Performed by: NURSE PRACTITIONER

## 2021-09-22 PROCEDURE — 36415 COLL VENOUS BLD VENIPUNCTURE: CPT

## 2021-09-22 PROCEDURE — 770006 HCHG ROOM/CARE - MED/SURG/GYN SEMI*

## 2021-09-22 PROCEDURE — 80048 BASIC METABOLIC PNL TOTAL CA: CPT

## 2021-09-22 PROCEDURE — 83735 ASSAY OF MAGNESIUM: CPT

## 2021-09-22 PROCEDURE — A9270 NON-COVERED ITEM OR SERVICE: HCPCS | Performed by: NURSE PRACTITIONER

## 2021-09-22 RX ADMIN — OMEPRAZOLE 20 MG: 20 CAPSULE, DELAYED RELEASE ORAL at 05:37

## 2021-09-22 RX ADMIN — ACETAMINOPHEN 1000 MG: 500 TABLET ORAL at 05:36

## 2021-09-22 RX ADMIN — ACETAMINOPHEN 1000 MG: 500 TABLET ORAL at 19:30

## 2021-09-22 RX ADMIN — OXYCODONE 5 MG: 5 TABLET ORAL at 15:30

## 2021-09-22 RX ADMIN — LORATADINE 10 MG: 10 TABLET ORAL at 05:37

## 2021-09-22 RX ADMIN — OXYCODONE HYDROCHLORIDE 10 MG: 10 TABLET ORAL at 21:32

## 2021-09-22 RX ADMIN — ACETAMINOPHEN 1000 MG: 500 TABLET ORAL at 23:15

## 2021-09-22 RX ADMIN — ACETAMINOPHEN 1000 MG: 500 TABLET ORAL at 13:46

## 2021-09-22 ASSESSMENT — PAIN DESCRIPTION - PAIN TYPE
TYPE: ACUTE PAIN;SURGICAL PAIN
TYPE: ACUTE PAIN;SURGICAL PAIN

## 2021-09-22 NOTE — CARE PLAN
The patient is Stable - Low risk of patient condition declining or worsening    Shift Goals  Clinical Goals: mobilize, tolerate diet    Progress made toward(s) clinical / shift goals: patient up self and ambulating without difficulty; patient tolerating GI soft diet without nausea or vomiting     Patient is not progressing towards the following goals:      Problem: Pain - Standard  Goal: Alleviation of pain or a reduction in pain to the patient’s comfort goal  Outcome: Progressing     Problem: Urinary Elimination  Goal: Establish and maintain regular urinary output  Outcome: Progressing     Problem: Mobility  Goal: Patient's capacity to carry out activities will improve  Outcome: Progressing

## 2021-09-22 NOTE — OR NURSING
Report given to Rukhsana BOSCH via SBAR reviewed orders and patient history, no questions at this time.  Pt alert and oriented, resp even and nonlabored, in NAD, pt  Pain is tolerable and declines medication and no nausea at this time. Pt moves all ext, follows commands and verbalized understanding  of poc and discharge/admission. Family notified via text/phone patient is moving to phase II/room. Will con't to monitor until patient has transitioned.  Belongings on bed.

## 2021-09-22 NOTE — CARE PLAN
The patient is Stable - Low risk of patient condition declining or worsening    Shift Goals  Clinical Goals: pain management, rest    Progress made toward(s) clinical / shift goals:    Problem: Pain - Standard  Goal: Alleviation of pain or a reduction in pain to the patient’s comfort goal  Outcome: Progressing     Problem: Knowledge Deficit - Standard  Goal: Patient and family/care givers will demonstrate understanding of plan of care, disease process/condition, diagnostic tests and medications  Outcome: Progressing     Problem: Communication  Goal: The ability to communicate needs accurately and effectively will improve  Outcome: Progressing     Problem: Self Care  Goal: Patient will have the ability to perform ADLs independently or with assistance (bathe, groom, dress, toilet and feed)  Outcome: Progressing       Patient is not progressing towards the following goals:

## 2021-09-22 NOTE — PROGRESS NOTES
"S:  61 y.o.male s/p robotic-assisted laparoscopic low anterior resection with creation of diverting loop ileostomy POD# 1.  Patient did well overnight.  Participating with ostomy education.  Denies nausea or vomiting.    O:  /90   Pulse 77   Temp 36.4 °C (97.6 °F) (Temporal)   Resp 18   Ht 1.753 m (5' 9\")   Wt 95.3 kg (210 lb 1.6 oz)   SpO2 98%   I/O last 3 completed shifts:  In: 1900 [P.O.:550; I.V.:1350]  Out: 1950 [Urine:1450; Drains:250]  Recent Labs     09/22/21  0406   SODIUM 136   POTASSIUM 4.1   CHLORIDE 101   CO2 23   GLUCOSE 197*   BUN 7*   CREATININE 0.74   CALCIUM 8.8     Recent Labs     09/22/21  0406   WBC 11.2*   RBC 5.21   HEMOGLOBIN 14.9   HEMATOCRIT 44.7   MCV 85.8   MCH 28.6   MCHC 33.3*   RDW 42.2   PLATELETCT 276   MPV 9.9       Alert and Oriented x3, No Acute Distress  Normal Respiratory Effort  Abdomen soft, appropriately tender  Incisions/Bandages clean/dry/intact  Extremities warm and well perfused  Ostomy pink and healthy, output scant  HECTOR Output Serosanguinous    A/P:61-year-old male with history of rectal cancer status post low anterior resection with creation of a diverting loop ileostomy  Patient encouraged to take meals in the chair rather then the bed.  Patient educated on appropriate judicious use of narcotics.  Hep-Lock IV.  Encourage by mouth intake.  Lozada catheter removed and patient voiding appropriately.  Advance to low residue diet.  Patient encouraged to ambulate ad gabriela.  Damian Camacho M.D. PhD  [facility]  Colon and Rectal Surgery  (Office) [phone]  (Cell)  [phone]  "

## 2021-09-22 NOTE — ANESTHESIA TIME REPORT
Anesthesia Start and Stop Event Times     Date Time Event    9/21/2021 1210 Ready for Procedure     1254 Anesthesia Start     1734 Anesthesia Stop        Responsible Staff  09/21/21    Name Role Begin End    Martin Arguelles M.D. Anesth 1254 1733        Preop Diagnosis (Free Text):  Pre-op Diagnosis     MALIGNANT TUMOR OF COLON        Preop Diagnosis (Codes):    Post op Diagnosis  Rectal cancer (HCC)      Premium Reason  A. 3PM - 7AM    Comments:

## 2021-09-22 NOTE — ANESTHESIA POSTPROCEDURE EVALUATION
Patient: Surinder Serrano    Procedure Summary     Date: 09/21/21 Room / Location: [place] [id number] / SURGERY [facility]    Anesthesia Start: 1254 Anesthesia Stop: 1734    Procedures:       RESECTION, LOW ANTERIOR, ROBOT-ASSISTED, LAPAROSCOPIC, USING DA ERIC XI, SPLENIC FLEXURE MOBILIZATION (Abdomen)      SIGMOIDOSCOPY  FLEXIBLE (Anus)      CREATION,  END ILEOSTOMY (Abdomen) Diagnosis: (Rectal cancer)    Surgeons: Damian Camacho M.D. Responsible Provider: Martin Arguelles M.D.    Anesthesia Type: general ASA Status: 3          Final Anesthesia Type: general  Last vitals  BP   Blood Pressure: 155/94    Temp   36.1 °C (97 °F)    Pulse   95   Resp   18    SpO2   98 %      Anesthesia Post Evaluation    Patient location during evaluation: PACU  Patient participation: complete - patient participated  Level of consciousness: awake and alert    Airway patency: patent  Anesthetic complications: no  Cardiovascular status: hemodynamically stable and hypertensive  Respiratory status: acceptable  Hydration status: euvolemic  Comments: BP on high side as it was initially.    PONV: none          No complications documented.     Nurse Pain Score: 3 (NPRS)

## 2021-09-22 NOTE — DISCHARGE PLANNING
Care Transition Team Assessment    Information Source  Orientation Level: Oriented X4  Information Given By: Patient  Informant's Name:  (Surinder)  Who is responsible for making decisions for patient? : Patient    Readmission Evaluation  Is this a readmission?: No    Elopement Risk  Legal Hold: No  Ambulatory or Self Mobile in Wheelchair: Yes  Disoriented: No  Psychiatric Symptoms: None  History of Wandering: No  Elopement this Admit: No  Vocalizing Wanting to Leave: No  Displays Behaviors, Body Language Wanting to Leave: No-Not at Risk for Elopement  Elopement Risk: Not at Risk for Elopement    Interdisciplinary Discharge Planning  Patient or legal guardian wants to designate a caregiver: No    Discharge Preparedness  What is your plan after discharge?: Home with help  What are your discharge supports?: Spouse  Prior Functional Level: Independent with Activities of Daily Living    Functional Assesment  Prior Functional Level: Independent with Activities of Daily Living    Finances  Financial Barriers to Discharge: No  Prescription Coverage: Yes    Vision / Hearing Impairment  Right Eye Vision: Wears Contacts  Left Eye Vision: Wears Glasses         Advance Directive  Advance Directive?: None  Advance Directive offered?: AD Booklet refused    Domestic Abuse  Have you ever been the victim of abuse or violence?: No  Physical Abuse or Sexual Abuse: No  Verbal Abuse or Emotional Abuse: No  Possible Abuse/Neglect Reported to:: Not Applicable    Psychological Assessment  History of Substance Abuse: None  History of Psychiatric Problems: No    Discharge Risks or Barriers  Discharge risks or barriers?: No    Anticipated Discharge Information  Discharge Disposition: Discharged to home/self care (01)

## 2021-09-22 NOTE — PROGRESS NOTES
Received report from previous shift RN  Assessment complete.  A&O x 4. Patient calls appropriately.  Patient ambulates with SBA assist.    Patient has 3/10 pain. Pain managed with prescribed medications.  Denies N&V. Tolerating low fiber, GI soft diet.  abd lap sites x4 with dermabond. 2 lap sites NOEMI, 2 lap sites with gauze and tegaderm, CDI  HECTOR to R side  Colostomy in place, no output at this time  + void via cooley  Patient denies SOB.      Review plan with of care with patient. Call light and personal belongings with in reach. Hourly rounding in place. All needs met at this time.

## 2021-09-22 NOTE — PROGRESS NOTES
Bedside report received.  Assessment complete.  A&O x 4. Patient calls appropriately.  Patient ambulates with SB assist.    Patient has 4/10 pain. Pain managed with prescribed medications.  Denies N&V. Tolerating GI soft diet.  X 5 lap sites with dermabond, approximated, CDI. Dressing to 2 sites. RLQ ostomy, patent, CDI. RLQ HECTOR drain with dressing, CDI.   + void, + flatus, - BM.  Patient denies SOB.  SCD's off.    Review plan with of care with patient. Call light and personal belongings within reach. Hourly rounding in place. All needs met at this time.

## 2021-09-22 NOTE — OP REPORT
Operative Report    Date: 9/21/2021    Surgeon: Damian Camacho M.D.    Assistant: Maddison CHOI  The indications for a surgical assistant in this surgery were indicated due to complexity of the procedure. Their role included aiding in incision, retraction, holding devices including camera for laparoscopic procedure, and closure of the wound.      Anesthesiologist: Dr Arguelles    Pre-operative Diagnosis:  Rectal cancer in a polyp    Post-operative Diagnosis: same     Procedure:   1)  ROBOTIC ASSISTED LOW ANTERIOR RESECTION with splenic flexure mobilization  2)  HIGH LIGATION OF INFERIOR MESENTERIC ARTERY WITH REGIONAL LYMPHADENECTOMY  3)  INTRAOPERATIVE INTRAVASCULAR INJECTION OF FLOUROSCOPIC DYE TO ASSESS PERFUSION   4) Flexible sigmoidoscopy    Clinical preamble:    This is a 61 y.o. male who presented with rectal cancer resected in a polyp.   The patient was found to have 10 cm from the anal verge.    Preoperative workup included  pelvic MR demonstrating no residual tumor.    As such the patient was referred after multidisciplinary conference recommendations for preoperative chemoradiation and subsequent surgery for which the patient has presented today.    An extensive PARQ conference was held with the patient and his family, in regard to possibility of no residual tumor. The patient was made aware of the alternatives, including operative and non-operative: surveillance. The risks of bleeding, infection, damage to surrounding structures, need for reoperation, leak, hernia, fistula, stroke, MI, and death were discussed with the patient. The patient was given a chance to ask questions, and all his questions were answered. The patient demonstrates adequate understanding, seems pleased with the plan, and wishes to proceed.    Procedure in detail: After informed consent was obtained, the patient was taken to the operating room, placed in supine position on a pink pad. The patient underwent general  endotracheal anesthesia without incident.  The patient's arms were tucked and the chest and shoulders were padded and secured to the operating room table.   The patient was then moved to lithotomy in yellowfin stirrups with all skin and joint surfaces padded and protected appropriately.The rectum was washed out with Betadine and the abdomen was prepped and draped in a sterile fashion. A timeout was performed verifying the correct patient, procedure, site, positioning in availability of equipment prior to the start of surgery.    Operation was begun by placing a 5 mm periumbilical incision through which a 5 mm trocar was introduced into the abdomen using the Optiview technique. After pneumoperitoneum was achieved, additional trocars were placed, 15 mm in the right lower quadrant and 8 mm in the left upper quadrant. An 8 and a 5 mm assistant port were placed and the camera port was upsized to an 8 mm trocar as well. All trocars were placed with TAP Block performed by anesthesia with ultrasound guidance.    The patient was positioned in steep Trendelenburg and right lateral decubitus and before the da Sola robotic system was docked the patient was noted to be securely  positioned on the table.  We took down the left lateral attachments of the sigmoid colon, identifying the left ureter which was preserved throughout the remainder of the procedure.  We then opened the retroperitoneal space in the right side and dissected in   the bloodless plane, isolated the INDIGO pedicle away from the pelvic nerves and ureters. The INDIGO was then divided near its origin with a robotic stapler with a vascular load. We then used electrocautery to mobilize the left colon up to the splenic flexure, taking down omental attachments and adhesions and producing adequate length to reach the pelvis for anastomosis.    Electrocautery was now used in the presacral space in the bloodless plane. Dissection was carried down and left and right laterally,  freeing up the rectum. We chose a spot of healthy rectum, divided the mesorectum with a vessel sealer at this level and then divided the rectum itself with a green load robotic staple fire.    Indocyanine green immunofluorescent dye was given and the Firefly system used to confirm excellent blood flow to the planned anastomotic site in the rectum and descending colon.     We made an extraction incision in the left lower quadrant, carried down with electrocautery to the level of the fascia. The anterior fascia was opened, the muscles retracted medially and the posterior fascia opened as well. Wound retractor was secured into position and the specimen delivered out onto the operative field. We divided the mesentery with 0 Vicryl ties between Bender clamps and then divided the colon itself with a Furness clamp. A 29 EEA anvil was loaded, tied down and washed with Betadine. This was reduced ack in the abdominal cavity. A peon clamp was then used  To make the wound retractor airtight and with pneumoperitoneum re-achieved, we inserted a 29 EEA stapler into the patient's rectum. Luke was deployed and an end-to-end anastomosis carried out with hemostasis. Insufflation test was performed and noted to have no demonstrated air leak.  We observed hemostasis throughout the operative field, closed the 12 mm trocar site with an Endoclose device. Pneumoperitoneum was reduced and all trocars were removed.    At this point numbers of the operating team changed into clean gown and gloves and all of the instruments used in the previous portions of the procedure were moved away from the operating field.  Instruments which had not been previously detached during  The case were now used  For the remainder of the procedure.  The extraction site was closed in 2 layers with running 0 PDS sutures in the posterior and anterior rectus sheaths.   Skin incision was closed with 4-0 Monocryl. Dermabond was placed and the patient returned to the PACU  in stable condition. All instruments counts were correct at the end of the procedureThe patient was awakened from general anesthetic, and was taken to the recovery room in stable condition.    Specimen:   Sigmoid colon and rectum with staple line marking distal margin      EBL: 100mL    UOP: See anesthetic record    Drains: None    Dispo: PACU    Summary:     1.  Procedure:  Total Mesorectal excision with colorectal anastomosis  a diverting loop ileostomy.  2. Technique: Robotic-assisted laparoscopic  3. Diverting ileostomy: Yes  4.  Height of tumor: From anal verge on sigmoidoscope was 10 centimeters preoperatively  5.  Height of anastomosis:  From anal verge for suspected to be 5 centimeters, this was not examined using sigmoidoscopy  6.  Anastomosis: Stapled  7.  Reconstruction: End to end anastomosis  8.   Colonic flexure mobilization: Yes  9.   Air leak test: Yes   10.  Multivisceral reduction: No   11.  Intra-abdominal adhesions: None  12.  Preoperative radiation:  None  13.  Preoperative staging: MRI and CT abdomen and pelvis and chest  14.  Preoperative stage:N/A  15. TME Specimen:  Grade complete  16.  Residual Cancer: None  17.  Blood transfusion: No  18:  Unplanned events:  None  19.  Operative urgency:  Elective    Damian Camacho MD PhD  [facility]  Colon and Rectal Surgeon  [phone]

## 2021-09-22 NOTE — PROGRESS NOTES
4 Eyes Skin Assessment Completed by Patricia RN and Helen RN.    Head WDL  Ears WDL  Nose WDL  Mouth WDL  Neck WDL  Breast/Chest WDL  Shoulder Blades WDL  Spine WDL  (R) Arm/Elbow/Hand WDL  (L) Arm/Elbow/Hand WDL  Abdomen X 5 lap sites with dermabond, approximated, CDI. Dressing to 2 sites. RLQ ostomy, patent, CDI. RLQ HECTOR drain with dressing, CDI.   Groin WDL  Scrotum/Coccyx/Buttocks Redness and Blanching  (R) Leg WDL  (L) Leg WDL  (R) Heel/Foot/Toe WDL  (L) Heel/Foot/Toe WDL          Devices In Places Pulse Ox      Interventions In Place Pressure Redistribution Mattress    Possible Skin Injury No    Pictures Uploaded Into Epic N/A  Wound Consult Placed N/A  RN Wound Prevention Protocol Ordered No

## 2021-09-23 LAB
ANION GAP SERPL CALC-SCNC: 9 MMOL/L (ref 7–16)
BUN SERPL-MCNC: 7 MG/DL (ref 8–22)
CALCIUM SERPL-MCNC: 9.1 MG/DL (ref 8.5–10.5)
CHLORIDE SERPL-SCNC: 103 MMOL/L (ref 96–112)
CO2 SERPL-SCNC: 26 MMOL/L (ref 20–33)
CREAT SERPL-MCNC: 0.77 MG/DL (ref 0.5–1.4)
CRP SERPL HS-MCNC: 4.28 MG/DL (ref 0–0.75)
ERYTHROCYTE [DISTWIDTH] IN BLOOD BY AUTOMATED COUNT: 43.6 FL (ref 35.9–50)
GLUCOSE SERPL-MCNC: 148 MG/DL (ref 65–99)
HCT VFR BLD AUTO: 44 % (ref 42–52)
HGB BLD-MCNC: 14.9 G/DL (ref 14–18)
MCH RBC QN AUTO: 29.3 PG (ref 27–33)
MCHC RBC AUTO-ENTMCNC: 33.9 G/DL (ref 33.7–35.3)
MCV RBC AUTO: 86.4 FL (ref 81.4–97.8)
PLATELET # BLD AUTO: 266 K/UL (ref 164–446)
PMV BLD AUTO: 9.7 FL (ref 9–12.9)
POTASSIUM SERPL-SCNC: 4.4 MMOL/L (ref 3.6–5.5)
RBC # BLD AUTO: 5.09 M/UL (ref 4.7–6.1)
SODIUM SERPL-SCNC: 138 MMOL/L (ref 135–145)
WBC # BLD AUTO: 15.7 K/UL (ref 4.8–10.8)

## 2021-09-23 PROCEDURE — 36415 COLL VENOUS BLD VENIPUNCTURE: CPT

## 2021-09-23 PROCEDURE — 85027 COMPLETE CBC AUTOMATED: CPT

## 2021-09-23 PROCEDURE — 770006 HCHG ROOM/CARE - MED/SURG/GYN SEMI*

## 2021-09-23 PROCEDURE — 700102 HCHG RX REV CODE 250 W/ 637 OVERRIDE(OP): Performed by: NURSE PRACTITIONER

## 2021-09-23 PROCEDURE — 97602 WOUND(S) CARE NON-SELECTIVE: CPT

## 2021-09-23 PROCEDURE — 86140 C-REACTIVE PROTEIN: CPT

## 2021-09-23 PROCEDURE — 80048 BASIC METABOLIC PNL TOTAL CA: CPT

## 2021-09-23 PROCEDURE — A9270 NON-COVERED ITEM OR SERVICE: HCPCS | Performed by: NURSE PRACTITIONER

## 2021-09-23 RX ADMIN — ACETAMINOPHEN 1000 MG: 500 TABLET ORAL at 04:07

## 2021-09-23 RX ADMIN — OXYCODONE HYDROCHLORIDE 10 MG: 10 TABLET ORAL at 15:44

## 2021-09-23 RX ADMIN — ACETAMINOPHEN 1000 MG: 500 TABLET ORAL at 23:34

## 2021-09-23 RX ADMIN — OXYCODONE HYDROCHLORIDE 10 MG: 10 TABLET ORAL at 12:34

## 2021-09-23 RX ADMIN — OMEPRAZOLE 20 MG: 20 CAPSULE, DELAYED RELEASE ORAL at 04:07

## 2021-09-23 RX ADMIN — OXYCODONE 5 MG: 5 TABLET ORAL at 09:25

## 2021-09-23 RX ADMIN — LORATADINE 10 MG: 10 TABLET ORAL at 04:07

## 2021-09-23 RX ADMIN — ACETAMINOPHEN 1000 MG: 500 TABLET ORAL at 15:25

## 2021-09-23 ASSESSMENT — LIFESTYLE VARIABLES
HOW MANY TIMES IN THE PAST YEAR HAVE YOU HAD 5 OR MORE DRINKS IN A DAY: 0
ALCOHOL_USE: YES
EVER HAD A DRINK FIRST THING IN THE MORNING TO STEADY YOUR NERVES TO GET RID OF A HANGOVER: NO
EVER FELT BAD OR GUILTY ABOUT YOUR DRINKING: NO
HAVE PEOPLE ANNOYED YOU BY CRITICIZING YOUR DRINKING: NO
HOW MANY TIMES IN THE PAST YEAR HAVE YOU HAD 5 OR MORE DRINKS IN A DAY: 0
DOES PATIENT WANT TO STOP DRINKING: NO
HAVE YOU EVER FELT YOU SHOULD CUT DOWN ON YOUR DRINKING: YES
ALCOHOL_USE: YES
TOTAL SCORE: 1
ON A TYPICAL DAY WHEN YOU DRINK ALCOHOL HOW MANY DRINKS DO YOU HAVE: 2
HAVE YOU EVER FELT YOU SHOULD CUT DOWN ON YOUR DRINKING: YES
EVER HAD A DRINK FIRST THING IN THE MORNING TO STEADY YOUR NERVES TO GET RID OF A HANGOVER: NO
DOES PATIENT WANT TO STOP DRINKING: NO
ON A TYPICAL DAY WHEN YOU DRINK ALCOHOL HOW MANY DRINKS DO YOU HAVE: 2
TOTAL SCORE: 1
AVERAGE NUMBER OF DAYS PER WEEK YOU HAVE A DRINK CONTAINING ALCOHOL: 3
AVERAGE NUMBER OF DAYS PER WEEK YOU HAVE A DRINK CONTAINING ALCOHOL: 3
TOTAL SCORE: 1
EVER FELT BAD OR GUILTY ABOUT YOUR DRINKING: NO
CONSUMPTION TOTAL: INCOMPLETE
CONSUMPTION TOTAL: NEGATIVE

## 2021-09-23 ASSESSMENT — PAIN DESCRIPTION - PAIN TYPE
TYPE: ACUTE PAIN
TYPE: ACUTE PAIN

## 2021-09-23 NOTE — PROGRESS NOTES
Assumed care of patient at 0645. Bedside report received. Assessment complete.  AA&Ox4. Denies CP/SOB.  Reporting 4/10 pain. Medicated per MAR.  Educated patient regarding pharmacologic and non pharmacologic modalities for pain management.  Skin per flowsheets  Tolerating GI soft diet. Denies N/V.  + void. Output to ostomy   Pt ambulates self, no assistance required  All needs met at this time. Call light within reach. Pt calls appropriately. Bed low and locked, non skid socks in place. Hourly rounding in place.

## 2021-09-23 NOTE — CARE PLAN
The patient is Stable - Low risk of patient condition declining or worsening    Shift Goals  Clinical Goals: pain management, rest    Progress made toward(s) clinical / shift goals:    Problem: Pain - Standard  Goal: Alleviation of pain or a reduction in pain to the patient’s comfort goal  Outcome: Progressing     Problem: Knowledge Deficit - Standard  Goal: Patient and family/care givers will demonstrate understanding of plan of care, disease process/condition, diagnostic tests and medications  Outcome: Progressing     Problem: Communication  Goal: The ability to communicate needs accurately and effectively will improve  Outcome: Progressing     Problem: Urinary Elimination  Goal: Establish and maintain regular urinary output  Outcome: Progressing     Problem: Mobility  Goal: Patient's capacity to carry out activities will improve  Outcome: Progressing     Problem: Self Care  Goal: Patient will have the ability to perform ADLs independently or with assistance (bathe, groom, dress, toilet and feed)  Outcome: Progressing     Problem: Infection - Standard  Goal: Patient will remain free from infection  Outcome: Progressing       Patient is not progressing towards the following goals:

## 2021-09-23 NOTE — PROGRESS NOTES
Received report from previous shift RN  Assessment complete.  A&O x 4. Patient calls appropriately.  Patient ambulates with SBA assist.   Patient has 3/10 pain. Pain managed with prescribed medications.  Denies N&V. Tolerating regular diet.  abd lap sites x4 with dermabond. 2 lap sites NOEMI, 2 with gauze and tegaderm, CDI.  HECTOR to R side  Ileostomy to R side  + void, + flatus  Patient denies SOB.      Review plan with of care with patient. Call light and personal belongings with in reach. Hourly rounding in place. All needs met at this time.

## 2021-09-23 NOTE — FACE TO FACE
Face to Face Supporting Documentation - Home Health    The encounter with this patient was in whole or in part the primary reason for home health admission.    Date of encounter:   Patient:                    MRN:                       YOB: 2021  Surinder Serrano  [id number]  1960     Home health to see patient for:  Wound Care    Skilled need for:  Surgical Aftercare ileostomy    Skilled nursing interventions to include:  Wound Care    Homebound status evidenced by:  Needs the assistance of another person in order to leave the home. Leaving home requires a considerable and taxing effort. There is a normal inability to leave the home.    Community Physician to provide follow up care: Nesha Matias D.O.     Optional Interventions? No      I certify the face to face encounter for this home health care referral meets the CMS requirements and the encounter/clinical assessment with the patient was, in whole, or in part, for the medical condition(s) listed above, which is the primary reason for home health care. Based on my clinical findings: the service(s) are medically necessary, support the need for home health care, and the homebound criteria are met.  I certify that this patient has had a face to face encounter by myself.  aDmian Camacho M.D. - NPI: [id number]

## 2021-09-23 NOTE — WOUND TEAM
" [facility]  Inpatient Services  New Ostomy Management & Teaching    HPI:  Reviewed  PMH: Reviewed   SH: Reviewed    Subjective: \"I want to sit up in the chair today\"    Objective: patient wife at bedside, both the patient and wife are eager to learn. Patient states he will be caring for it. Patient went in on 9/21 for a lap lower anterior resection with creation of diverting loop ileostomy.                      Ileostomy 09/21/21 Loop RLQ (Active)   Stomal Appliance Assessment Clean;Dry;Intact;Changed 09/23/21 1300   Stoma Assessment Red 09/23/21 1300   Stoma Shape Oval;Budded Greater Than One Inch 09/23/21 1300   Peristomal Assessment Wound 09/23/21 1300   Mucocutaneous Junction Intact 09/23/21 1300   Treatment Appliance Changed;Bag Change;Cleansed with water/washcloth;Crusted with stoma powder;Site care 09/23/21 1300   Peristomal Protectant No Sting Skin Prep;Stoma Powder;Paste Ring 09/23/21 1300   Stomal Appliance 2 3/4\" (70mm) CTF;Paste Ring, 2\" 09/23/21 1300   Output (mL) 50 mL 09/23/21 1300   Output Color Brown 09/23/21 1300   WOUND RN ONLY - Stomal Appliance  2 Piece;Paste Ring, 2\";2 3/4\" (70mm) CTF 09/23/21 1300   Appliance Brand Keturah 09/23/21 1300   Secure Start completed Yes 09/23/21 1300   WOUND NURSE ONLY - Time Spent with Patient (mins) 75 09/23/21 1300                      Ostomy Appliance (type and size): 2 3/4\" 2 piece and paste ring    Interventions: This RN filled out [employer] catalog and went over with patient and wife. Also discussed use of a belt. Patient to do most of change today with minimal help and prompts from this ostomy RN. Patient removed current appliance using the push pull method. Cleansed peristomal skin with moist warm wash cloth. Traced current template onto new barrier, patient cut to fit. Applied no sting skin prep. Then this RN applied mepitel one for patient over the blistered areas. Patient attached pouch to barrier, closed pouch, peeled back dog tags " "for easier removal, and assisted this RN in placing it on abdomen. Patient gently rubbed in place.          Pt education: Questions and concerns addressed    Evaluation: 9/23/21: patient did extremely well. Asked appropriate questions throughout and completed entire change self with minimal to no prompts. This ostomy RN will come one more time prior to discharge.      Patient stoma is viable. Patient very eager to learn, asks appropriate questions, patient wife is a good support system for patient at home, also eager to learn with patient. Patient in 2 3/4\" at this time, do not anticipate any delays with education in regards to ostomy appliance. Patient to assist bedside staff with emptying and burping. Secure start completed. Given information regarding stealth belt. Will complete [employer] prior to discharge.      Flatus: Present  Stool Output: brown and liquid  Diet: Minimal appetite  Mobility: Up to chair    Plan: Ostomy nurses to continue to follow for ostomy needs and teaching until discharge    Anticipated discharge needs: Supplies, supplier information, possible HH, outpatient ostomy clinic, Skilled Nursing/Rehab     Secure Start Signed Yes  Outpatient Referral Placed Yes  5 Sets of appliances in Ostomy bag for discharge No    INSURANCE OPTIONS: [employer]         X - completed & at bedside       [facility] ([employer])              MediCARE/MEDICAID & All other Private Insurance companies (Prism Form)              MediCAID & Fee for Service (Care Chest Paperwork + Prism Form)                            Form signed/Catalog Marked and Copy left with patient OR medicaid paperwork given to patient      Anticipated Discharge Plans:  Self Care , will place outpatient referral as well.   "

## 2021-09-23 NOTE — WOUND TEAM
" [facility]  Inpatient Services  New Ostomy Management & Teaching    HPI:  Reviewed  PMH: Reviewed   SH: Reviewed    Subjective: \"we can do it whenever works for you\"    Objective: patient wife at bedside, both the patient and wife are eager to learn. Patient states he will be caring for it. Patient went in on 9/21 for a lap lower anterior resection with creation of diverting loop ileostomy.                                    Ileostomy 09/21/21 Loop RLQ (Active)   Stomal Appliance Assessment Changed;Intact;Dry;Clean 09/22/21 1300   Stoma Assessment Red 09/22/21 1300   Stoma Shape Oval;Budded Greater Than One Inch 09/22/21 1300   Peristomal Assessment Wound 09/22/21 1300   Mucocutaneous Junction Intact 09/22/21 1300   Treatment Appliance Changed;Bag Change;Cleansed with water/washcloth;Site care 09/22/21 1300   Peristomal Protectant No Sting Skin Prep 09/22/21 1300   Stomal Appliance 2 3/4\" (70mm) CTF 09/22/21 1300   Output Color Brown 09/22/21 1300   WOUND RN ONLY - Stomal Appliance  2 Piece;2 3/4\" (70mm) CTF;Paste Ring, 2\" 09/22/21 1300   Appliance Brand Keturah 09/22/21 1300   Secure Start completed Yes 09/22/21 1300   WOUND NURSE ONLY - Time Spent with Patient (mins) 75 09/22/21 1300                                        Ostomy Appliance (type and size): 2 3/4\" 2 piece and paste ring    Interventions: This RN discussed and introduced ostomy folder. Secure start was signed, address verified. Discussed each of the parts for the appliance change. Patient completed some hands on, but this RN completed most of the change while explaining throughout. Patient demonstrated how to burp bag self. Then This RN assisted patient in removing current appliance using the push pull method. Cleansed peristomal skin with moist warm wash cloth. Created new template with the 2 3/4\" barrier, traced and cut to fit. Discussed paste ring and crusting, did not complete due to another change scheduled for tomorrow with " "ostomy RN. Applied no sting skin prep to periwound, applied mepitel one over the small partial thickness blistered areas to protect. Patient attached pouch to barrier, closed pouch, peeled back dog tags for easier removal, and assisted this RN in placing it on abdomen. Patient gently rubbed in place.      Pt education: Questions and concerns addressed    Evaluation: Patient stoma is viable. Patient very eager to learn, asks appropriate questions, patient wife is a good support system for patient at home, also eager to learn with patient. Patient in 2 3/4\" at this time, do not anticipate any delays with education in regards to ostomy appliance. Patient to assist bedside staff with emptying and burping. Secure start completed. Given information regarding stealth belt. Will complete [employer] prior to discharge.      Flatus: Present  Stool Output: brown and liquid  Diet: Minimal appetite  Mobility: Up to chair    Plan: Ostomy nurses to continue to follow for ostomy needs and teaching until discharge    Anticipated discharge needs: Supplies, supplier information, possible HH, outpatient ostomy clinic, Skilled Nursing/Rehab     Secure Start Signed Yes  Outpatient Referral Placed Yes  5 Sets of appliances in Ostomy bag for discharge No    INSURANCE OPTIONS: [facility]         X (will complete prior to discharge)       [facility] ([employer])              MediCARE/MEDICAID & All other Private Insurance companies (Prism Form)              MediCAID & Fee for Service (Care Chest Paperwork + Prism Form)                            Form signed/Catalog Marked and Copy left with patient OR medicaid paperwork given to patient      Anticipated Discharge Plans:  Self Care , will place outpatient referral as well.   "

## 2021-09-23 NOTE — CARE PLAN
Problem: Pain - Standard  Goal: Alleviation of pain or a reduction in pain to the patient’s comfort goal  Outcome: Progressing  Pt pain medicated per mar, provided non pharmacological modalities of pain management    Problem: Knowledge Deficit - Standard  Goal: Patient and family/care givers will demonstrate understanding of plan of care, disease process/condition, diagnostic tests and medications  Outcome: Progressing   Pt educated on medication this shift, verbalized understanding   Problem: Communication  Goal: The ability to communicate needs accurately and effectively will improve  Outcome: Progressing   Pt able to verbalize needs  The patient is Stable - Low risk of patient condition declining or worsening    Shift Goals  Clinical Goals: (P) Pain Management  Patient Goals: (P) Ostomy care education  Family Goals: (P) N/A    Progress made toward(s) clinical / shift goals:  Pt demonstrated ability to perform ostomy care independently     Patient is not progressing towards the following goals:

## 2021-09-23 NOTE — PROGRESS NOTES
"S:  61 y.o.male s/p robotic-assisted laparoscopic low anterior resection with creation of diverting loop ileostomy POD# 2.  Patient did well overnight.  Participating with ostomy education.  Denies nausea or vomiting.  Tolerating a regular diet and ambulating without assistance.  Out of bed to chair.    O:  /84   Pulse 79   Temp 37.2 °C (99 °F) (Temporal)   Resp 18   Ht 1.753 m (5' 9\")   Wt 95.3 kg (210 lb 1.6 oz)   SpO2 96%   I/O last 3 completed shifts:  In: 1900 [P.O.:550; I.V.:1350]  Out: 1950 [Urine:1450; Drains:250]  Recent Labs     09/22/21  0406 09/23/21  0317   SODIUM 136 138   POTASSIUM 4.1 4.4   CHLORIDE 101 103   CO2 23 26   GLUCOSE 197* 148*   BUN 7* 7*   CREATININE 0.74 0.77   CALCIUM 8.8 9.1     Recent Labs     09/22/21  0406 09/23/21  0317   WBC 11.2* 15.7*   RBC 5.21 5.09   HEMOGLOBIN 14.9 14.9   HEMATOCRIT 44.7 44.0   MCV 85.8 86.4   MCH 28.6 29.3   MCHC 33.3* 33.9   RDW 42.2 43.6   PLATELETCT 276 266   MPV 9.9 9.7       Alert and Oriented x3, No Acute Distress  Normal Respiratory Effort  Abdomen soft, appropriately tender  Incisions/Bandages clean/dry/intact  Extremities warm and well perfused  Ostomy pink and healthy, output with stool and flatus in a well fitted appliance  HECTOR Output Serosanguinous 180 cc output    A/P:61-year-old male with history of rectal cancer status post low anterior resection with creation of a diverting loop ileostomy  Patient encouraged to take meals in the chair rather then the bed.  Patient educated on appropriate judicious use of narcotics.  Continue ostomy education  Lozada catheter removed and patient voiding appropriately.  Encourage p.o. intake  Patient encouraged to ambulate ad gabriela.  Pathology pending  WBC increase we will trend CRP.  Patient is afebrile no tachycardia low suspicion for leak  HECTOR output for creatinine  Damian Camacho M.D. PhD  [facility]  Colon and Rectal Surgery  (Office) [phone]  (Cell)  [phone]  "

## 2021-09-24 LAB
ANION GAP SERPL CALC-SCNC: 13 MMOL/L (ref 7–16)
BUN SERPL-MCNC: 7 MG/DL (ref 8–22)
CALCIUM SERPL-MCNC: 9.4 MG/DL (ref 8.5–10.5)
CHLORIDE SERPL-SCNC: 101 MMOL/L (ref 96–112)
CO2 SERPL-SCNC: 25 MMOL/L (ref 20–33)
CREAT SERPL-MCNC: 0.73 MG/DL (ref 0.5–1.4)
CRP SERPL HS-MCNC: 6.97 MG/DL (ref 0–0.75)
ERYTHROCYTE [DISTWIDTH] IN BLOOD BY AUTOMATED COUNT: 43.1 FL (ref 35.9–50)
GLUCOSE SERPL-MCNC: 147 MG/DL (ref 65–99)
HCT VFR BLD AUTO: 42 % (ref 42–52)
HGB BLD-MCNC: 14.2 G/DL (ref 14–18)
MCH RBC QN AUTO: 28.9 PG (ref 27–33)
MCHC RBC AUTO-ENTMCNC: 33.8 G/DL (ref 33.7–35.3)
MCV RBC AUTO: 85.4 FL (ref 81.4–97.8)
PLATELET # BLD AUTO: 258 K/UL (ref 164–446)
PMV BLD AUTO: 9.6 FL (ref 9–12.9)
POTASSIUM SERPL-SCNC: 4.3 MMOL/L (ref 3.6–5.5)
RBC # BLD AUTO: 4.92 M/UL (ref 4.7–6.1)
SODIUM SERPL-SCNC: 139 MMOL/L (ref 135–145)
WBC # BLD AUTO: 11.6 K/UL (ref 4.8–10.8)

## 2021-09-24 PROCEDURE — 85027 COMPLETE CBC AUTOMATED: CPT

## 2021-09-24 PROCEDURE — 700111 HCHG RX REV CODE 636 W/ 250 OVERRIDE (IP): Performed by: NURSE PRACTITIONER

## 2021-09-24 PROCEDURE — 80048 BASIC METABOLIC PNL TOTAL CA: CPT

## 2021-09-24 PROCEDURE — 36415 COLL VENOUS BLD VENIPUNCTURE: CPT

## 2021-09-24 PROCEDURE — 700102 HCHG RX REV CODE 250 W/ 637 OVERRIDE(OP): Performed by: NURSE PRACTITIONER

## 2021-09-24 PROCEDURE — 86140 C-REACTIVE PROTEIN: CPT

## 2021-09-24 PROCEDURE — 302098 PASTE RING (FLAT): Performed by: SURGERY

## 2021-09-24 PROCEDURE — 770006 HCHG ROOM/CARE - MED/SURG/GYN SEMI*

## 2021-09-24 PROCEDURE — A9270 NON-COVERED ITEM OR SERVICE: HCPCS | Performed by: NURSE PRACTITIONER

## 2021-09-24 RX ADMIN — ACETAMINOPHEN 1000 MG: 500 TABLET ORAL at 04:12

## 2021-09-24 RX ADMIN — ACETAMINOPHEN 1000 MG: 500 TABLET ORAL at 21:59

## 2021-09-24 RX ADMIN — LORATADINE 10 MG: 10 TABLET ORAL at 04:12

## 2021-09-24 RX ADMIN — ACETAMINOPHEN 1000 MG: 500 TABLET ORAL at 14:47

## 2021-09-24 RX ADMIN — OMEPRAZOLE 20 MG: 20 CAPSULE, DELAYED RELEASE ORAL at 04:12

## 2021-09-24 RX ADMIN — ENOXAPARIN SODIUM 40 MG: 40 INJECTION SUBCUTANEOUS at 11:17

## 2021-09-24 ASSESSMENT — PAIN DESCRIPTION - PAIN TYPE
TYPE: ACUTE PAIN

## 2021-09-24 NOTE — PROGRESS NOTES
"S:  61 y.o.male s/p robotic-assisted laparoscopic low anterior resection with creation of diverting loop ileostomy     POD #3.    Patient did well overnight.    Participating with ostomy education.    Denies nausea or vomiting.    Tolerating a regular diet and ambulating without assistance.    Out of bed to chair.  Wife at bedside    O:  /85   Pulse 85   Temp 37.2 °C (99 °F) (Temporal)   Resp 18   Ht 1.753 m (5' 9\")   Wt 95.3 kg (210 lb 1.6 oz)   SpO2 95%   I/O last 3 completed shifts:  In: 1900 [P.O.:550; I.V.:1350]  Out: 1950 [Urine:1450; Drains:250]  Recent Labs     09/22/21  0406 09/23/21 0317 09/24/21  0532   SODIUM 136 138 139   POTASSIUM 4.1 4.4 4.3   CHLORIDE 101 103 101   CO2 23 26 25   GLUCOSE 197* 148* 147*   BUN 7* 7* 7*   CREATININE 0.74 0.77 0.73   CALCIUM 8.8 9.1 9.4     Recent Labs     09/22/21  0406 09/23/21 0317 09/24/21  0532   WBC 11.2* 15.7* 11.6*   RBC 5.21 5.09 4.92   HEMOGLOBIN 14.9 14.9 14.2   HEMATOCRIT 44.7 44.0 42.0   MCV 85.8 86.4 85.4   MCH 28.6 29.3 28.9   MCHC 33.3* 33.9 33.8   RDW 42.2 43.6 43.1   PLATELETCT 276 266 258   MPV 9.9 9.7 9.6       Alert and Oriented x3, No Acute Distress  Normal Respiratory Effort  Abdomen soft, appropriately tender  Incisions/Bandages clean/dry/intact  Extremities warm and well perfused  Ostomy pink and healthy, output with stool and flatus in a well fitted appliance  HECTOR Output Serosanguinous 180 cc output    A/P:61-year-old male with history of rectal cancer status post low anterior resection with creation of a diverting loop ileostomy  Patient encouraged to take meals in the chair rather then the bed.    Patient educated on appropriate judicious use of narcotics.  Continue ostomy education  Encourage p.o. intake  Patient encouraged to ambulate ad gabriela.  Pathology pending  WBCs improved this AM, CRP trending  Start lovenox today, labs in AM  Plan home likely tomorrow      "

## 2021-09-24 NOTE — CARE PLAN
The patient is Stable - Low risk of patient condition declining or worsening    Shift Goals  Clinical Goals: pain management   Patient Goals: discharge  Family Goals: N/A    Progress made toward(s) clinical / shift goals: patient reporting little to no pain at this time; assess pain Q2-4H, administer PRNs as needed; plan to DC tomorrow    Patient is not progressing towards the following goals:      Problem: Pain - Standard  Goal: Alleviation of pain or a reduction in pain to the patient’s comfort goal  Outcome: Progressing     Problem: Knowledge Deficit - Standard  Goal: Patient and family/care givers will demonstrate understanding of plan of care, disease process/condition, diagnostic tests and medications  Outcome: Progressing

## 2021-09-24 NOTE — WOUND TEAM
" [facility]  Inpatient Services  New Ostomy Management & Teaching    HPI:  Reviewed  PMH: Reviewed   SH: Reviewed    Subjective: \"I hope I can go home\"    Objective: patient wife at bedside, both the patient and wife are eager to learn. Patient states he will be caring for it. Patient went in on 9/21 for a lap lower anterior resection with creation of diverting loop ileostomy.                         Ileostomy 09/21/21 Loop RLQ (Active)   Stomal Appliance Assessment Clean;Dry;Intact;Changed 09/24/21 0930   Stoma Assessment Red 09/24/21 0930   Stoma Shape Oval;Budded Greater Than One Inch 09/24/21 0930   Peristomal Assessment Wound 09/24/21 0930   Mucocutaneous Junction Intact 09/24/21 0930   Treatment Appliance Changed;Bag Change;Cleansed with water/washcloth;Site care 09/24/21 0930   Peristomal Protectant No Sting Skin Prep;Stoma Powder;Paste Ring 09/24/21 0930   Stomal Appliance Paste Ring, 2\";2 3/4\" (70mm) CTF 09/24/21 0930   Output (mL) 50 mL 09/24/21 0930   Output Color Brown 09/24/21 0930   WOUND RN ONLY - Stomal Appliance  2 Piece;Paste Ring, 2\";2 3/4\" (70mm) CTF 09/24/21 0930   Appliance (Pouch) # [id number] 09/24/21 0930   Appliance Brand [employer] 09/24/21 0930   Appliance Supplier [facility] 09/24/21 0930   Secure Start completed Yes 09/24/21 0930   Ostomy Consult Wound care RN consult ordered 09/24/21 0930   WOUND NURSE ONLY - Time Spent with Patient (mins) 60 09/24/21 0930                   Ostomy Appliance (type and size): 2 3/4\" 2 piece and paste ring    Interventions:Patient completed entire change. Patient removed current appliance using the push pull method. Cleansed peristomal skin with moist warm wash cloth. Traced current template onto new barrier, patient cut to fit. Applied no sting skin prep. Then this RN applied mepitel one for patient over the blistered areas, patient verbalized understanding. Applied paste ring to barrier. Small amount of stoma powder applied into 0700 area. " "Patient attached pouch to barrier, closed pouch, peeled back dog tags for easier removal, and placed it on abdomen. Patient gently rubbed in place.          Pt education: Questions and concerns addressed    Evaluation: 9/24/21: patient completed entire change self, wife able to assist in prompting when needed. Discussed use of paste ring and used today, verbalized understanding. Discussed crusting and how to do it, patient verbalized understanding. Patient and wife verbalized understanding of [employer] and supplies, no further questions or concerns. Extra supplies given. Ostomy RN available for further education if needed, but no further follow up needed at this time. Patient confident with care.     9/23/21: patient did extremely well. Asked appropriate questions throughout and completed entire change self with minimal to no prompts. This ostomy RN will come one more time prior to discharge.      Patient stoma is viable. Patient very eager to learn, asks appropriate questions, patient wife is a good support system for patient at home, also eager to learn with patient. Patient in 2 3/4\" at this time, do not anticipate any delays with education in regards to ostomy appliance. Patient to assist bedside staff with emptying and burping. Secure start completed. Given information regarding stealth belt. Will complete [employer] prior to discharge.      Flatus: Present  Stool Output: brown and liquid  Diet: Minimal appetite  Mobility: Up to chair    Plan: Ostomy nurses to continue to follow for ostomy needs and teaching until discharge    Anticipated discharge needs: Supplies, supplier information, possible HH, outpatient ostomy clinic, Skilled Nursing/Rehab     Secure Start Signed Yes  Outpatient Referral Placed Yes  5 Sets of appliances in Ostomy bag for discharge Yes    INSURANCE OPTIONS: [employer]         X - completed & at bedside       [employer] & [employer] ([employer])              MediCARE/MEDICAID & " All other Private Insurance companies (Prism Form)              MediCAID & Fee for Service (Care Chest Paperwork + Prism Form)                            Form signed/Catalog Marked and Copy left with patient OR medicaid paperwork given to patient      Anticipated Discharge Plans:  Self Care , will place outpatient referral as well.

## 2021-09-24 NOTE — PROGRESS NOTES
Bedside report received.  Assessment complete.  A&O x 4. Patient calls appropriately.  Patient ambulates with no assist.    Patient has 0/10 pain. Pain managed with prescribed medications.  Denies N&V. Tolerating GI soft diet.  X 5 lap sites with dermabond, approximated, CDI. Dressing to 2 sites. RLQ ostomy, patent, CDI. RLQ HECTOR drain with dressing, CDI.   + void, + flatus, + BM via ostomy  Patient denies SOB.  SCD's off.  Review plan with of care with patient. Call light and personal belongings within reach. Hourly rounding in place. All needs met at this time.

## 2021-09-24 NOTE — PROGRESS NOTES
COVID-19 surge in effect.    Received report from previous shift RN  Assessment complete.  A&O x 4. Patient calls appropriately.  Patient ambulates with no assist.   Patient has 3/10 pain. Pain managed with prescribed medications.  Denies N&V. Tolerating low fiber, GI soft diet.  Abd lap sites x4 with dermabond. 2 NOEMI, 2 with gauze/tegaderm, CDI.  HECTOR to R side.  RLQ ileostomy.  + void, + flatus.  Patient denies SOB.      Review plan with of care with patient. Call light and personal belongings with in reach. Hourly rounding in place. All needs met at this time.

## 2021-09-25 VITALS
RESPIRATION RATE: 18 BRPM | OXYGEN SATURATION: 96 % | HEIGHT: 69 IN | BODY MASS INDEX: 31.12 KG/M2 | WEIGHT: 210.1 LBS | DIASTOLIC BLOOD PRESSURE: 82 MMHG | HEART RATE: 88 BPM | TEMPERATURE: 97.8 F | SYSTOLIC BLOOD PRESSURE: 140 MMHG

## 2021-09-25 LAB
ANION GAP SERPL CALC-SCNC: 10 MMOL/L (ref 7–16)
BUN SERPL-MCNC: 9 MG/DL (ref 8–22)
CALCIUM SERPL-MCNC: 8.9 MG/DL (ref 8.5–10.5)
CHLORIDE SERPL-SCNC: 103 MMOL/L (ref 96–112)
CO2 SERPL-SCNC: 25 MMOL/L (ref 20–33)
CREAT SERPL-MCNC: 0.73 MG/DL (ref 0.5–1.4)
CRP SERPL HS-MCNC: 5.88 MG/DL (ref 0–0.75)
ERYTHROCYTE [DISTWIDTH] IN BLOOD BY AUTOMATED COUNT: 42.7 FL (ref 35.9–50)
GLUCOSE SERPL-MCNC: 135 MG/DL (ref 65–99)
HCT VFR BLD AUTO: 40.5 % (ref 42–52)
HGB BLD-MCNC: 13.6 G/DL (ref 14–18)
MCH RBC QN AUTO: 28.6 PG (ref 27–33)
MCHC RBC AUTO-ENTMCNC: 33.6 G/DL (ref 33.7–35.3)
MCV RBC AUTO: 85.3 FL (ref 81.4–97.8)
PLATELET # BLD AUTO: 268 K/UL (ref 164–446)
PMV BLD AUTO: 9.5 FL (ref 9–12.9)
POTASSIUM SERPL-SCNC: 4 MMOL/L (ref 3.6–5.5)
RBC # BLD AUTO: 4.75 M/UL (ref 4.7–6.1)
SODIUM SERPL-SCNC: 138 MMOL/L (ref 135–145)
WBC # BLD AUTO: 10.8 K/UL (ref 4.8–10.8)

## 2021-09-25 PROCEDURE — 700111 HCHG RX REV CODE 636 W/ 250 OVERRIDE (IP): Performed by: NURSE PRACTITIONER

## 2021-09-25 PROCEDURE — 700102 HCHG RX REV CODE 250 W/ 637 OVERRIDE(OP): Performed by: NURSE PRACTITIONER

## 2021-09-25 PROCEDURE — 36415 COLL VENOUS BLD VENIPUNCTURE: CPT

## 2021-09-25 PROCEDURE — 80048 BASIC METABOLIC PNL TOTAL CA: CPT

## 2021-09-25 PROCEDURE — 86140 C-REACTIVE PROTEIN: CPT

## 2021-09-25 PROCEDURE — A9270 NON-COVERED ITEM OR SERVICE: HCPCS | Performed by: NURSE PRACTITIONER

## 2021-09-25 PROCEDURE — 85027 COMPLETE CBC AUTOMATED: CPT

## 2021-09-25 RX ORDER — OXYCODONE HYDROCHLORIDE 5 MG/1
5 TABLET ORAL EVERY 6 HOURS PRN
Qty: 10 TABLET | Refills: 0 | Status: SHIPPED | OUTPATIENT
Start: 2021-09-25 | End: 2021-09-28

## 2021-09-25 RX ADMIN — ACETAMINOPHEN 1000 MG: 500 TABLET ORAL at 06:12

## 2021-09-25 RX ADMIN — LORATADINE 10 MG: 10 TABLET ORAL at 06:12

## 2021-09-25 RX ADMIN — ENOXAPARIN SODIUM 40 MG: 40 INJECTION SUBCUTANEOUS at 08:36

## 2021-09-25 RX ADMIN — OMEPRAZOLE 20 MG: 20 CAPSULE, DELAYED RELEASE ORAL at 06:12

## 2021-09-25 ASSESSMENT — PAIN DESCRIPTION - PAIN TYPE: TYPE: ACUTE PAIN

## 2021-09-25 NOTE — PROGRESS NOTES
Patient A&Ox4, VSS on RA.  Tolerating PO, denies nausea.  Pain well controlled.  Ambulating independently.  Self care with ileostomy.  HECTOR drain removed.  PIV removed.  DC instructions reviewed with patient and wife.  All questions answered.  Reviewed lovenox injection with return demonstration.  Patient DC home in stable condition with all belongings.        COVID-19 surge in effect.

## 2021-09-25 NOTE — DISCHARGE SUMMARY
Discharge Summary    CHIEF COMPLAINT ON ADMISSION  No chief complaint on file.      Reason for Admission  Malignant neoplasm of colon, unspe*     Admission Date  9/21/2021    CODE STATUS  Full Code    HPI & HOSPITAL COURSE  This is a 61 y.o. male here with colon cancer resected during colonoscopy with concern for residual.  Patient underwent elective low anterior resection with loop ileostomy.  He did well following surgery.  Ambulating well.  Tolerating diet.  Ileostomy functioning well.  Patient underwent ileostomy teaching as well as Lovenox teaching.  Drain removed prior to discharge.  No concerns at discharge.      Therefore, he is discharged in good and stable condition to home with close outpatient follow-up.    The patient met 2-midnight criteria for an inpatient stay at the time of discharge.    Discharge Date  September 25, 2021    FOLLOW UP ITEMS POST DISCHARGE  Follow-up with Dr. Camacho in 1 week    DISCHARGE DIAGNOSES  Principal Problem:    Rectosigmoid cancer (HCC) (Chronic) POA: Yes  Resolved Problems:    * No resolved hospital problems. *      FOLLOW UP  No future appointments.  Damian Camacho M.D.  [address] [id number] [phone]    Schedule an appointment as soon as possible for a visit in 1 week        MEDICATIONS ON DISCHARGE     Medication List      START taking these medications      Instructions   enoxaparin 40 MG/0.4ML Soln inj  Start taking on: September 26, 2021  Commonly known as: LOVENOX   Inject 40 mg under the skin every day for 26 days.  Dose: 40 mg     oxyCODONE immediate-release 5 MG Tabs  Commonly known as: ROXICODONE   Take 1 Tablet by mouth every 6 hours as needed for up to 3 days.  Dose: 5 mg        CONTINUE taking these medications      Instructions   loratadine 10 MG Tabs  Commonly known as: CLARITIN   Take 10 mg by mouth every day.  Dose: 10 mg     omeprazole 20 MG delayed-release capsule  Commonly known as: PRILOSEC   Take 20 mg by mouth every  day.  Dose: 20 mg     Vitamin D 50 MCG (2000 UT) Caps   Take 1 Capsule by mouth every day.  Dose: 1 Capsule        STOP taking these medications    metroNIDAZOLE 500 MG Tabs  Commonly known as: FLAGYL     neomycin 500 MG Tabs  Commonly known as: MYCIFRADIN            Allergies  No Known Allergies    DIET  Orders Placed This Encounter   Procedures   • Diet Order Diet: Low Fiber(GI Soft)     Standing Status:   Standing     Number of Occurrences:   1     Order Specific Question:   ERAS     Answer:   Yes     Order Specific Question:   Diet:     Answer:   Low Fiber(GI Soft) [2]       ACTIVITY  Light duty.  10-lb lifting restriction    CONSULTATIONS  Wound care    PROCEDURES  Robotic low anterior resection, loop ileostomy    LABORATORY  Lab Results   Component Value Date    SODIUM 138 09/25/2021    POTASSIUM 4.0 09/25/2021    CHLORIDE 103 09/25/2021    CO2 25 09/25/2021    GLUCOSE 135 (H) 09/25/2021    BUN 9 09/25/2021    CREATININE 0.73 09/25/2021        Lab Results   Component Value Date    WBC 10.8 09/25/2021    HEMOGLOBIN 13.6 (L) 09/25/2021    HEMATOCRIT 40.5 (L) 09/25/2021    PLATELETCT 268 09/25/2021        Total time of the discharge process exceeds 30 minutes.

## 2021-09-25 NOTE — DISCHARGE INSTRUCTIONS
Discharge Instructions    Discharged to home by car with relative. Discharged via wheelchair, hospital escort: Yes.  Special equipment needed: Not Applicable    Be sure to schedule a follow-up appointment with your primary care doctor or any specialists as instructed.     Discharge Plan:   Diet Plan: Discussed  Activity Level: Discussed  Confirmed Follow up Appointment: Patient to Call and Schedule Appointment  Confirmed Symptoms Management: Discussed  Medication Reconciliation Updated: Yes  Influenza Vaccine Indication: Patient Refuses    I understand that a diet low in cholesterol, fat, and sodium is recommended for good health. Unless I have been given specific instructions below for another diet, I accept this instruction as my diet prescription.   Other diet: low fiber/soft foods    Robotic/Laparoscopic Surgery Discharge Instructions:    1. DIET: Upon discharge from the hospital you may resume your normal preoperative diet. Depending on how you are feeling and whether you have nausea or not, you may wish to stay with a bland diet for the first few days. However, you can advance your diet as quickly as you feel ready.    2. ACTIVITIES: You have a ten pound weight lifting restriction for four to six weeks after surgery.  This means no lifting anything heavier than a gallon of milk.  Routine activities such as walking and using the stairs are safe.  You may sleep in any position that is comfortable. Avoid strenuous activities and exercise that involves twisting, bending, and, running.    3. DRIVING: You may drive whenever you are off pain medications and are able to perform the activities needed to drive, i.e. turning, bending, twisting, wearing a seat belt, etc.    4. BATHING: You may shower two days following your surgery, but do not submerge in a bath or a pool until you after your first postoperative visit.    5. BOWEL FUNCTION: You may develop either frequent or loose stools after meals. This usually corrects  itself after a few days, to a few weeks.    You may develop constipation. The combination of pain medication and decreased activity level can cause constipation in otherwise normal patients. If you feel this is occurring, increase your fluid intake and take an over the counter laxative (Milk of Magnesia, Miralax, or Magnesium Citrate).  If this is not successful, take an over the counter Fleet enema and repeat the laxative until effect.    6. PAIN MEDICATION: You may be given a prescription for pain medication at discharge. Please take these as directed. It is important to remember not to take medications on an empty stomach as this may cause nausea.  Wean the use of pain medication as soon as possible.  If narcotics were prescribed, try to avoid their use and try non-narcotic medications, such as tylenol first.    7. CALL IF YOU HAVE: (1) Fevers of more than 101 F (38.5 C), (2) New chest or leg pain, (3) Worsening abdominal pain, (4) Persistent vomiting, (5) Large quantity bleeding, (6) Drainage from incision that is foul smelling, increased pain at the wound, wound edges that have pulled apart, redness or swelling at the incision site. Please do not hesitate to call with any other questions.     8. FOLLOW-UP APPOINTMENT: Contact my office at [phone] for a follow-up appointment in 1 to 2 weeks following your procedure.    If you have any additional questions, please do not hesitate to call the office and speak to either myself or the physician on call.    Office address:  [address] [place] [address] [address] [place] [id number]        [facility]  General Surgery  Colon and Rectal Surgeon    · Is patient discharged on Warfarin / Coumadin?   No       Laparoscopic Colectomy, Care After  This sheet gives you information about how to care for yourself after your procedure. Your health care provider may also give you more specific instructions. If you have problems or questions, contact your health care provider.  What  can I expect after the procedure?  After your procedure, it is common to have the following:  · Pain in your abdomen, especially in the incision areas. You will be given medicine to control the pain.  · Tiredness. This is a normal part of the recovery process. Your energy level will return to normal over the next several weeks.  · Changes in your bowel movements, such as constipation or needing to go more often. Talk with your health care provider about how to manage this.  Follow these instructions at home:  Medicines  · Take over-the-counter and prescription medicines only as told by your health care provider.  · Do not drive or use heavy machinery while taking prescription pain medicine.  · Do not drink alcohol while taking prescription pain medicine.  · If you were prescribed an antibiotic medicine, use it as told by your health care provider. Do not stop using the antibiotic even if you start to feel better.  Incision care    · Follow instructions from your health care provider about how to take care of your incision areas. Make sure you:  ? Keep your incisions clean and dry.  ? Wash your hands with soap and water before and after applying medicine to the areas, and before and after changing your bandage (dressing). If soap and water are not available, use hand .  ? Change your dressing as told by your health care provider.  ? Leave stitches (sutures), skin glue, or adhesive strips in place. These skin closures may need to stay in place for 2 weeks or longer. If adhesive strip edges start to loosen and curl up, you may trim the loose edges. Do not remove adhesive strips completely unless your health care provider tells you to do that.  · Do not wear tight clothing over the incisions. Tight clothing may rub and irritate the incision areas, which may cause the incisions to open.  · Do not take baths, swim, or use a hot tub until your health care provider approves. Ask your health care provider if you can  take showers. You may only be allowed to take sponge baths for bathing.  · Check your incision area every day for signs of infection. Check for:  ? More redness, swelling, or pain.  ? More fluid or blood.  ? Warmth.  ? Pus or a bad smell.  Activity  · Avoid lifting anything that is heavier than 10 lb (4.5 kg) for 2 weeks or until your health care provider says it is okay.  · You may resume normal activities as told by your health care provider. Ask your health care provider what activities are safe for you.  · Take rest breaks during the day as needed.  Eating and drinking  · Follow instructions from your health care provider about what you can eat after surgery.  · To prevent or treat constipation while you are taking prescription pain medicine, your health care provider may recommend that you:  ? Drink enough fluid to keep your urine clear or pale yellow.  ? Take over-the-counter or prescription medicines.  ? Eat foods that are high in fiber, such as fresh fruits and vegetables, whole grains, and beans.  ? Limit foods that are high in fat and processed sugars, such as fried and sweet foods.  General instructions  · Ask your health care provider when you will need an appointment to get your sutures or staples removed.  · Keep all follow-up visits as told by your health care provider. This is important.  Contact a health care provider if:  · You have more redness, swelling, or pain around your incisions.  · You have more fluid or blood coming from the incisions.  · Your incisions feel warm to the touch.  · You have pus or a bad smell coming from your incisions or your dressing.  · You have a fever.  · You have an incision that breaks open (edges not staying together) after sutures or staples have been removed.  Get help right away if:  · You develop a rash.  · You have chest pain or difficulty breathing.  · You have pain or swelling in your legs.  · You feel light-headed or you faint.  · Your abdomen swells (becomes  distended).  · You have nausea or vomiting.  · You have blood in your stool (feces).  This information is not intended to replace advice given to you by your health care provider. Make sure you discuss any questions you have with your health care provider.  Document Released: 07/07/2006 Document Revised: 09/06/2019 Document Reviewed: 09/18/2017  [employer] Patient Education © 2020 [employer]        How and Where to Give Subcutaneous Enoxaparin Injections  Enoxaparin is an injectable medicine. It is used to help prevent blood clots from developing in your veins. Health care providers often use anticoagulants like enoxaparin to prevent clots following surgery. Enoxaparin is also used in combination with other medicines to treat blood clots and heart attacks. If blood clots are left untreated, they can be life threatening.  Enoxaparin comes in single-use syringes. You inject enoxaparin through a syringe into your belly (abdomen). You should change the injection site each time you give yourself a shot. Continue the enoxaparin injections as directed by your health care provider. Your health care provider will use blood clotting test results to decide when you can safely stop using enoxaparin injections. If your health care provider prescribes any additional medicines, use the medicines exactly as directed.  How do I inject enoxaparin?  1. Wash your hands with soap and water.  2. Clean the selected injection site as directed by your health care provider.  3. Remove the needle cap by pulling it straight off the syringe.  4. When using a prefilled syringe, do not push the air bubble out of the syringe before the injection. The air bubble will help you get all of the medicine out of the syringe.  5. Hold the syringe like a pencil using your writing hand.  6. Use your other hand to pinch and hold an inch of the cleansed skin.  7. Insert the entire needle straight down into the fold of skin.  8. Push the plunger with your  thumb until the syringe is empty.  9. Pull the needle straight out of your skin.  10. Enoxaparin injection prefilled syringes and graduated prefilled syringes are available with a system that shields the needle after injection. After you have completed your injection and removed the needle from your skin, firmly push down on the plunger. The protective sleeve will automatically cover the needle and you will hear a click. The click means the needle is safely covered.  11. Place the syringe in the nearest needle box, also called a sharps container. If you do not have a sharps container, you can use a hard-sided plastic container with a secure lid, such as an empty laundry detergent bottle.  What else do I need to know?  · Do not use enoxaparin if:  ¨ You have allergies to heparin or pork products.  ¨ You have been diagnosed with a condition called thrombocytopenia.  · Do not use the syringe or needle more than one time.  · Use medicines only as directed by your health care provider.  · Changes in medicines, supplements, diet, and illness can affect your anticoagulation therapy. Be sure to inform your health care provider of any of these changes.  · It is important that you tell all of your health care providers and your dentist that you are taking an anticoagulant, especially if you are injured or plan to have any type of procedure.  · While on anticoagulants, you will need to have blood tests done routinely as directed by your health care provider.  · While using this medicine, avoid physical activities or sports that could result in a fall or cause injury.  · Follow up with your laboratory test and health care provider appointments as directed. It is very important to keep your appointments. Not keeping appointments could result in a chronic or permanent injury, pain, or disability.  · Before giving your medicine, you should make sure the injection is a clear and colorless or pale yellow solution. If your medicine  becomes discolored or if there are particles in the syringe, do not use it and notify your health care provider.  · Keep your medicine safely stored at room temperature.  Contact a health care provider if:  · You develop any rashes on your skin.  · You have large areas of bruising on your skin.  · You have any worsening of the condition for which you take Enoxaparin.  · You develop a fever.  Get help right away if:  · You develop bleeding problems such as:  ¨ Bleeding from the gums or nose that does not stop quickly.  ¨ Vomiting blood or coughing up blood.  ¨ Blood in your urine.  ¨ Blood in your stool, or stool that has a dark, tarry, or coffee grounds appearance.  ¨ A cut that does not stop bleeding within 10 minutes.  These symptoms may represent a serious problem that is an emergency. Do not wait to see if the symptoms will go away. Get medical help right away. Call your local emergency services (911 in the [place] Do not drive yourself to the hospital.   This information is not intended to replace advice given to you by your health care provider. Make sure you discuss any questions you have with your health care provider.  Document Released: 10/19/2005 Document Revised: 08/24/2017 Document Reviewed: 06/04/2015  [employer] Interactive Patient Education © 2017 [employer]    Ileostomy Home Guide    An ileostomy is a surgical procedure to make an opening (stoma) for stool to leave your body. The surgery is done when a medical condition prevents stool from passing through the intestines and leaving your body through the rectum.  During the surgery, a part of the small intestine (ileum) is attached to the stoma made in the abdominal wall. A bag or pouch is fitted over the stoma. Stool and gas will collect in the pouch.  After having this surgery, you will need to empty and change your ileostomy pouch as needed. You will also need to take steps to care for the stoma.  What are the risks?  Risks include:  · Skin  irritation around the stoma.  · Leaks in the pouching system.  · Narrowing (constriction) of the stoma if the barrier is cut smaller than the stoma.  Supplies needed:  · One- or two-piece pouching system.  · Curved scissors to cut the ostomy barrier to fit your stoma, if needed.  · Ostomy belt, if needed.  · Any other items your health care provider has recommended, such as ostomy powder, paste, or skin barrier film.  · Soft washcloth or soft paper towel.  How do I care for my stoma?  Your stoma should look pink and moist. At first, the stoma may be swollen, but this swelling will go away within 6 weeks. To care for the stoma:  · Keep the skin around the stoma clean and dry.  · Use a clean, soft washcloth or soft paper towel to gently wash the stoma and the skin around it with warm water.  · Use stoma powder or paste on your skin only as told by your health care provider.  · If your skin becomes irritated, change your ileostomy pouch. The irritation may indicate that the pouch is leaking.  · Measure the stoma opening regularly and record the size. Watch for changes. Share the information with your health care provider.  · Check your stoma area every day for signs of infection. Check for:  ? More redness, swelling, or pain.  ? More fluid or blood.  ? Pus or warmth.  How do I care for my ileostomy pouch?  The pouch that fits over the stoma can have either one or two pieces.  · One-piece pouch: The skin barrier and the pouch are combined in one unit.  · Two-piece pouch: The skin barrier and the pouch are separate pieces that attach to each other.  Empty your pouch when it is one-third to one-half full. Do not let more stool or gas build up. This could cause the pouch to leak. Some pouches have a built-in gas release valve.  Change your pouch every 3-4 days for the first 6 weeks, and then every 5-7 days after that. You should also change the pouch right away if the skin near the stoma looks irritated.  How do I empty my  ileostomy pouch?  You will be taught how to empty your pouch before you leave the hospital. You may be told to:  1. Wash your hands with soap and water. If soap and water are not available, use hand .  2. Sit far back on the toilet.  3. Put pieces of toilet paper into the toilet water. This will prevent splashing as you empty the stool into the toilet bowl.  4. Unclip the tail end of the pouch or open it with the non-removable system that stays attached at the end of the pouch.  5. Unroll the tail of the pouch and empty the stool into the toilet.  6. Use toilet paper to clean the end of the pouch where the stool drained out.  7. Reroll the tail, and attach the clip or fastener that attaches the pieces together.  8. Wash your hands again.  How do I change my ileostomy pouch?  You will be taught how to change the pouch before you leave the hospital. You may be told to:  1. Lay out your supplies.  2. Wash your hands with soap and water. If soap and water are not available, use hand .  3. Carefully remove the old pouch.  4. Wash the stoma and the skin around the stoma. Allow the area to dry. Men may be told to carefully shave any hair around the stoma.  5. Use the stoma measuring guide that comes with your pouch to decide what size hole you will need to cut in the skin barrier piece. Choose the smallest possible size that will go around the stoma but will not touch it.  6. Use the guide to trace the [place] on the back of the skin barrier piece.  7. Cut out the hole.  8. Hold the skin barrier piece over the stoma to make sure the hole is the correct size.  9. Remove the adhesive paper backing from the skin barrier piece.  10. If instructed by your health care provider, squeeze stoma paste around the opening of the skin barrier piece.  11. Clean and dry the skin around the stoma again.  12. Carefully fit the skin barrier piece over your stoma.  13. If you are using a two-piece pouch, snap the pouch onto  the skin barrier piece.  14. Close the tail of the pouch.  15. Put your hand over the top of the skin barrier piece to help warm it for about 5 minutes. This helps it conform to your body better.  16. Wash your hands again.  What are some general tips?  · Avoid wearing tight clothing over your stoma.  · You can shower with or without the pouch in place. Do not use harsh or oily soaps or lotions. Always keep the pouch on if you are taking a bath or swimming.  · If your pouch gets wet, you can dry it with a hair dryer on the cool setting.  · Whenever you leave home, take extra clothing and ostomy supplies with you, including a skin barrier and pouch.  · Store all supplies in a cool, dry place. Do not leave supplies in extreme heat as this can melt the parts.  · Return to your normal activities as told by your health care provider. Ask your health care provider what activities are safe for you.  Where to find more information  · [facility]: www.ostomy.org  Contact a health care provider if:  · You have more redness, swelling, or pain around your stoma.  · You have more fluid or blood coming from your stoma.  · Your stoma feels warm to the touch.  · You have pus coming from your stoma.  · Your stoma extends in or out farther than normal.  · Your stoma becomes purple, black, or pale white.  · You need to change the pouch every day.  · You have a fever.  · You have abdominal pain, nausea, or bloating.  · No stool is passing from the stoma.  · You have diarrhea, requiring you to empty the pouch more frequently than normal.  Get help right away if:  · Your stool is bloody.  · You vomit.  · You have trouble breathing.  · You feel dizzy or light-headed.  Summary  · You will need to empty and change your ileostomy pouch as told by your health care provider.  You will need to take angeli  Low-Fiber Eating Plan  Fiber is found in fruits, vegetables, whole grains, and beans. Eating a diet low in fiber helps  to reduce how often you have bowel movements and how much you produce during a bowel movement. A low-fiber eating plan may help your digestive system heal if:  You have certain conditions, such as Crohn's disease or diverticulitis.  You recently had radiation therapy on your pelvis or bowel.  You recently had intestinal surgery.  You have a new surgical opening in your abdomen (colostomy or ileostomy).  Your intestine is narrowed (stricture).  Your health care provider will determine how long you need to stay on this diet. Your health care provider may recommend that you work with a diet and nutrition specialist (dietitian).  What are tips for following this plan?  General guidelines  Follow recommendations from your dietitian about how much fiber you should have each day.  Most people on this eating plan should try to eat less than 10 grams (g) of fiber each day. Your daily fiber goal is _________________ g.  Take vitamin and mineral supplements as told by your health care provider or dietitian. Chewable or liquid forms are best when on this eating plan.  Reading food labels  Check food labels for the amount of dietary fiber.  Choose foods that have less than 2 grams of fiber in one serving.  Cooking  Use white flour and other allowed grains for baking and cooking.  Cook meat using methods that keep it tender, such as braising or poaching.  Cook eggs until the yolk is completely solid.  Cook with healthy oils, such as olive oil or canola oil.  Meal planning    Eat 5-6 small meals throughout the day instead of 3 large meals.  If you are lactose intolerant:  Choose low-lactose dairy foods.  Do not eat dairy foods, if told by your dietitian.  Limit fat and oils to less than 8 teaspoons a day.  Eat small portions of desserts.  What foods are allowed?  The items listed below may not be a complete list. Talk with your dietitian about what dietary choices are best for you.  Grains  All bread and crackers made with white  flour. Waffles, pancakes, and [place] toast. Bagels. Pretzels. [place] toast, zwieback, and matzoh. Cooked and dried cereals that do not contain whole grains, added fiber, seeds, or dried fruit. Cornmeal. [place]. Hot and cold cereals made with refined corn, wheat, rice, or oats. Plain pasta and noodles. White rice.  Vegetables  Well-cooked or canned vegetables without skin, seeds, or stems. Cooked potatoes without skins. Vegetable juice.  Fruits  Soft-cooked or canned fruits without skin and seeds. Peeled ripe banana. Applesauce. Fruit juice without pulp.  Meats and other protein foods  Ground meat. Tender cuts of meat or poultry. Eggs. Fish, seafood, and shellfish. Smooth nut butters. Tofu.  Dairy  All milk products and drinks. Lactose-free milks, including rice, soy, and almond milks. Yogurt without fruit, nuts, chocolate, or granola mix-ins. Sour cream. Cottage cheese. Cheese.  Beverages  Decaf coffee. Fruit and vegetable juices or smoothies (in small amounts, with no pulp or skins, and with fruits from allowed list). Sports drinks. Herbal tea.  Fats and oils  Olive oil, canola oil, sunflower oil, flaxseed oil, and grapeseed oil. Mayonnaise. Cream cheese. Margarine. Butter.  Sweets and desserts  Plain cakes and cookies. Cream pies and pies made with allowed fruits. Pudding. Custard. Fruit gelatin. Sherbet. Popsicles. Ice cream without nuts. Plain hard candy. Honey. Jelly. Molasses. Syrups, including chocolate syrup. Chocolate. Marshmallows. Gumdrops.  Seasoning and other foods  Bouillon. Broth. Cream soups made from allowed foods. Strained soup. Casseroles made with allowed foods. Ketchup. Mild mustard. Mild salad dressings. Plain gravies. Vinegar. Spices in moderation. Salt. Sugar.  What foods are not allowed?  The items listed below may not be a complete list. Talk with your dietitian about what dietary choices are best for you.  Grains  Whole wheat and whole grain breads and crackers. Multigrain breads and  crackers. Rye bread. Whole grain or multigrain cereals. Cereals with nuts, raisins, or coconut. Bran. Coarse wheat cereals. Granola. High-fiber cereals. Cornmeal or corn bread. Whole grain pasta. Wild or brown rice. Quinoa. Popcorn. Buckwheat. Wheat germ.  Vegetables  Potato skins. Raw or undercooked vegetables. All beans and bean sprouts. Cooked greens. Corn. Peas. Cabbage. Beets. Broccoli. [place] sprouts. Cauliflower. Mushrooms. Onions. Peppers. Parsnips. Okra. Sauerkraut.  Fruit  Raw or dried fruit. Berries. Fruit juice with pulp. Prune juice.  Meats and other protein foods  Tough, fibrous meats with gristle. Fatty meat. Poultry with skin. Fried meat, poultry, or fish. Deli or lunch meats. Sausage, meier, and hot dogs. Nuts and chunky nut butter. Dried peas, beans, and lentils.  Dairy  Yogurt with fruit, nuts, chocolate, or granola mix-ins.  Beverages  Caffeinated coffee and teas.  Fats and oils  Avocado. Coconut.  Sweets and desserts  Desserts, cookies, or candies that contain nuts or coconut. Dried fruit. Jams and preserves with seeds. Marmalade. Any dessert made with fruits or grains that are not allowed.  Seasoning and other foods  Corn tortilla chips. Soups made with vegetables or grains that are not allowed. Relish. Horseradish. Pickles. Olives.  Summary  Most people on a low-fiber eating plan should eat less than 10 grams of fiber a day. Follow recommendations from your dietitian about how much fiber you should have each day.  Always check food labels to see the dietary fiber content of packaged foods. In general, a low-fiber food will have fewer than 2 grams of fiber per serving.  In general, try to avoid whole grains, raw fruits and vegetables, dried fruit, tough cuts of meat, nuts, and seeds.  Take a vitamin and mineral supplement as told by your health care provider or dietitian.  This information is not intended to replace advice given to you by your health care provider. Make sure you discuss any  questions you have with your health care provider.  Document Released: 06/09/2003 Document Revised: 04/10/2020 Document Reviewed: 02/20/2018  [employer] Patient Education © 2020 [employer] Inc.  · ps to care for the stoma.  · Contact a health care provider if you need to change the pouch every day, have abdominal pain, nausea, or bloating, or if you have diarrhea, requiring you to empty the pouch more frequently than normal.  · Get help right away if your stool is bloody.  This information is not intended to replace advice given to you by your health care provider. Make sure you discuss any questions you have with your health care provider.  Document Released: 12/20/2004 Document Revised: 11/21/2019 Document Reviewed: 11/21/2019  [employer] Patient Education © 2020 Elsevier Inc.          Depression / Suicide Risk    As you are discharged from this [facility] facility, it is important to learn how to keep safe from harming yourself.    Recognize the warning signs:  · Abrupt changes in personality, positive or negative- including increase in energy   · Giving away possessions  · Change in eating patterns- significant weight changes-  positive or negative  · Change in sleeping patterns- unable to sleep or sleeping all the time   · Unwillingness or inability to communicate  · Depression  · Unusual sadness, discouragement and loneliness  · Talk of wanting to die  · Neglect of personal appearance   · Rebelliousness- reckless behavior  · Withdrawal from people/activities they love  · Confusion- inability to concentrate     If you or a loved one observes any of these behaviors or has concerns about self-harm, here's what you can do:  · Talk about it- your feelings and reasons for harming yourself  · Remove any means that you might use to hurt yourself (examples: pills, rope, extension cords, firearm)  · Get professional help from the community (Mental Health, Substance Abuse, psychological counseling)  · Do not be alone:Call  your Safe Contact- someone whom you trust who will be there for you.  · Call your local CRISIS HOTLINE 990-8901 or 283-775-4514  · Call your local Children's Mobile Crisis Response Team [place] (697) 209-8917 or www.[employer]ach  · Call the toll free National Suicide Prevention Hotlines   · National Suicide Prevention Lifeline 583-110-RCSH (9208)  · National [employer] Line Network 800-SUICIDE (927-8071)

## 2021-12-06 ENCOUNTER — PRE-ADMISSION TESTING (OUTPATIENT)
Dept: ADMISSIONS | Facility: MEDICAL CENTER | Age: 61
DRG: 331 | End: 2021-12-06
Attending: SURGERY
Payer: COMMERCIAL

## 2021-12-06 DIAGNOSIS — Z01.812 PRE-OPERATIVE LABORATORY EXAMINATION: ICD-10-CM

## 2021-12-06 LAB
ANION GAP SERPL CALC-SCNC: 11 MMOL/L (ref 7–16)
BUN SERPL-MCNC: 13 MG/DL (ref 8–22)
CALCIUM SERPL-MCNC: 9.4 MG/DL (ref 8.5–10.5)
CHLORIDE SERPL-SCNC: 104 MMOL/L (ref 96–112)
CO2 SERPL-SCNC: 23 MMOL/L (ref 20–33)
CREAT SERPL-MCNC: 0.62 MG/DL (ref 0.5–1.4)
ERYTHROCYTE [DISTWIDTH] IN BLOOD BY AUTOMATED COUNT: 43.4 FL (ref 35.9–50)
GLUCOSE SERPL-MCNC: 122 MG/DL (ref 65–99)
HCT VFR BLD AUTO: 43.8 % (ref 42–52)
HGB BLD-MCNC: 14.6 G/DL (ref 14–18)
MCH RBC QN AUTO: 28.2 PG (ref 27–33)
MCHC RBC AUTO-ENTMCNC: 33.3 G/DL (ref 33.7–35.3)
MCV RBC AUTO: 84.7 FL (ref 81.4–97.8)
PLATELET # BLD AUTO: 256 K/UL (ref 164–446)
PMV BLD AUTO: 9.6 FL (ref 9–12.9)
POTASSIUM SERPL-SCNC: 4.1 MMOL/L (ref 3.6–5.5)
RBC # BLD AUTO: 5.17 M/UL (ref 4.7–6.1)
SODIUM SERPL-SCNC: 138 MMOL/L (ref 135–145)
WBC # BLD AUTO: 7.4 K/UL (ref 4.8–10.8)

## 2021-12-06 PROCEDURE — 36415 COLL VENOUS BLD VENIPUNCTURE: CPT

## 2021-12-06 PROCEDURE — 85027 COMPLETE CBC AUTOMATED: CPT

## 2021-12-06 PROCEDURE — 80048 BASIC METABOLIC PNL TOTAL CA: CPT

## 2021-12-06 ASSESSMENT — FIBROSIS 4 INDEX: FIB4 SCORE: 0.77

## 2021-12-17 ENCOUNTER — APPOINTMENT (OUTPATIENT)
Dept: ADMISSIONS | Facility: MEDICAL CENTER | Age: 61
DRG: 331 | End: 2021-12-17
Attending: SURGERY
Payer: COMMERCIAL

## 2021-12-17 ENCOUNTER — HOSPITAL ENCOUNTER (OUTPATIENT)
Dept: RADIOLOGY | Facility: MEDICAL CENTER | Age: 61
End: 2021-12-17
Attending: SURGERY
Payer: COMMERCIAL

## 2021-12-17 DIAGNOSIS — Z93.2 ILEOSTOMY STATUS (HCC): ICD-10-CM

## 2021-12-17 DIAGNOSIS — Z01.812 PRE-OPERATIVE LABORATORY EXAMINATION: ICD-10-CM

## 2021-12-17 LAB
SARS-COV-2 RNA RESP QL NAA+PROBE: NOTDETECTED
SPECIMEN SOURCE: NORMAL

## 2021-12-17 PROCEDURE — 700117 HCHG RX CONTRAST REV CODE 255: Performed by: SURGERY

## 2021-12-17 PROCEDURE — U0005 INFEC AGEN DETEC AMPLI PROBE: HCPCS

## 2021-12-17 PROCEDURE — C9803 HOPD COVID-19 SPEC COLLECT: HCPCS

## 2021-12-17 PROCEDURE — U0003 INFECTIOUS AGENT DETECTION BY NUCLEIC ACID (DNA OR RNA); SEVERE ACUTE RESPIRATORY SYNDROME CORONAVIRUS 2 (SARS-COV-2) (CORONAVIRUS DISEASE [COVID-19]), AMPLIFIED PROBE TECHNIQUE, MAKING USE OF HIGH THROUGHPUT TECHNOLOGIES AS DESCRIBED BY CMS-2020-01-R: HCPCS

## 2021-12-17 PROCEDURE — 74270 X-RAY XM COLON 1CNTRST STD: CPT

## 2021-12-17 RX ADMIN — IOHEXOL 200 ML: 300 INJECTION, SOLUTION INTRAVENOUS at 08:30

## 2021-12-20 ENCOUNTER — ANESTHESIA EVENT (OUTPATIENT)
Dept: SURGERY | Facility: MEDICAL CENTER | Age: 61
DRG: 331 | End: 2021-12-20
Payer: COMMERCIAL

## 2021-12-20 NOTE — OR NURSING
COVID-19 Pre-surgery screenin. Do you have an undiagnosed respiratory illness or symptoms such as coughing or sneezing? No  a. Onset of Sx N/A  b. Acute vs. chronic respiratory illness N/A    2. Do you have an unexplained fever greater than 100.4 degrees Fahrenheit or 38 degrees Celsius?     No    3. Have you had direct exposure to a patient who tested positive for Covid-19?    No    4. Have you had any loss of your sense of taste or smell? Have you had N/V or sore throat? No    Patient has been informed of visitor policy and asked to wear a mask upon entering the hospital   Yes

## 2021-12-21 ENCOUNTER — ANESTHESIA (OUTPATIENT)
Dept: SURGERY | Facility: MEDICAL CENTER | Age: 61
DRG: 331 | End: 2021-12-21
Payer: COMMERCIAL

## 2021-12-21 ENCOUNTER — HOSPITAL ENCOUNTER (INPATIENT)
Facility: MEDICAL CENTER | Age: 61
LOS: 2 days | DRG: 331 | End: 2021-12-23
Attending: SURGERY | Admitting: SURGERY
Payer: COMMERCIAL

## 2021-12-21 LAB
GLUCOSE BLD-MCNC: 123 MG/DL (ref 65–99)
PATHOLOGY CONSULT NOTE: NORMAL

## 2021-12-21 PROCEDURE — 501838 HCHG SUTURE GENERAL: Performed by: SURGERY

## 2021-12-21 PROCEDURE — 700111 HCHG RX REV CODE 636 W/ 250 OVERRIDE (IP): Performed by: ANESTHESIOLOGY

## 2021-12-21 PROCEDURE — 160002 HCHG RECOVERY MINUTES (STAT): Performed by: SURGERY

## 2021-12-21 PROCEDURE — 160039 HCHG SURGERY MINUTES - EA ADDL 1 MIN LEVEL 3: Performed by: SURGERY

## 2021-12-21 PROCEDURE — 82962 GLUCOSE BLOOD TEST: CPT

## 2021-12-21 PROCEDURE — 160036 HCHG PACU - EA ADDL 30 MINS PHASE I: Performed by: SURGERY

## 2021-12-21 PROCEDURE — 64488 TAP BLOCK BI INJECTION: CPT | Performed by: SURGERY

## 2021-12-21 PROCEDURE — 160048 HCHG OR STATISTICAL LEVEL 1-5: Performed by: SURGERY

## 2021-12-21 PROCEDURE — 700101 HCHG RX REV CODE 250: Performed by: SURGERY

## 2021-12-21 PROCEDURE — 700105 HCHG RX REV CODE 258: Performed by: SURGERY

## 2021-12-21 PROCEDURE — 160028 HCHG SURGERY MINUTES - 1ST 30 MINS LEVEL 3: Performed by: SURGERY

## 2021-12-21 PROCEDURE — 501452 HCHG STAPLES, GIA MULTIFIRE 60/80: Performed by: SURGERY

## 2021-12-21 PROCEDURE — 0DBB0ZZ EXCISION OF ILEUM, OPEN APPROACH: ICD-10-PCS | Performed by: SURGERY

## 2021-12-21 PROCEDURE — 700102 HCHG RX REV CODE 250 W/ 637 OVERRIDE(OP): Performed by: NURSE PRACTITIONER

## 2021-12-21 PROCEDURE — 501433 HCHG STAPLER, GIA MULTIFIRE 60/80: Performed by: SURGERY

## 2021-12-21 PROCEDURE — 160009 HCHG ANES TIME/MIN: Performed by: SURGERY

## 2021-12-21 PROCEDURE — A9270 NON-COVERED ITEM OR SERVICE: HCPCS | Performed by: NURSE PRACTITIONER

## 2021-12-21 PROCEDURE — 700105 HCHG RX REV CODE 258: Performed by: ANESTHESIOLOGY

## 2021-12-21 PROCEDURE — A9270 NON-COVERED ITEM OR SERVICE: HCPCS | Performed by: ANESTHESIOLOGY

## 2021-12-21 PROCEDURE — 501435 HCHG STAPLER, LINEAR 60: Performed by: SURGERY

## 2021-12-21 PROCEDURE — 160035 HCHG PACU - 1ST 60 MINS PHASE I: Performed by: SURGERY

## 2021-12-21 PROCEDURE — 88304 TISSUE EXAM BY PATHOLOGIST: CPT

## 2021-12-21 PROCEDURE — 500380 HCHG DRAIN, PENROSE 1/4X12: Performed by: SURGERY

## 2021-12-21 PROCEDURE — 502704 HCHG DEVICE, LIGASURE IMPACT: Performed by: SURGERY

## 2021-12-21 PROCEDURE — 770001 HCHG ROOM/CARE - MED/SURG/GYN PRIV*

## 2021-12-21 PROCEDURE — C1765 ADHESION BARRIER: HCPCS | Performed by: SURGERY

## 2021-12-21 PROCEDURE — 700101 HCHG RX REV CODE 250: Performed by: ANESTHESIOLOGY

## 2021-12-21 PROCEDURE — 700105 HCHG RX REV CODE 258: Performed by: NURSE PRACTITIONER

## 2021-12-21 PROCEDURE — 700102 HCHG RX REV CODE 250 W/ 637 OVERRIDE(OP): Performed by: ANESTHESIOLOGY

## 2021-12-21 RX ORDER — CELECOXIB 200 MG/1
200 CAPSULE ORAL 2 TIMES DAILY
Status: DISCONTINUED | OUTPATIENT
Start: 2021-12-21 | End: 2021-12-23 | Stop reason: HOSPADM

## 2021-12-21 RX ORDER — ACETAMINOPHEN 500 MG
1000 TABLET ORAL ONCE
Status: COMPLETED | OUTPATIENT
Start: 2021-12-21 | End: 2021-12-21

## 2021-12-21 RX ORDER — METRONIDAZOLE 500 MG/1
500 TABLET ORAL SEE ADMIN INSTRUCTIONS
Status: ON HOLD | COMMUNITY
End: 2021-12-26

## 2021-12-21 RX ORDER — CELECOXIB 200 MG/1
200 CAPSULE ORAL 2 TIMES DAILY PRN
Status: DISCONTINUED | OUTPATIENT
Start: 2021-12-26 | End: 2021-12-23 | Stop reason: HOSPADM

## 2021-12-21 RX ORDER — SODIUM CHLORIDE, SODIUM LACTATE, POTASSIUM CHLORIDE, CALCIUM CHLORIDE 600; 310; 30; 20 MG/100ML; MG/100ML; MG/100ML; MG/100ML
INJECTION, SOLUTION INTRAVENOUS CONTINUOUS
Status: ACTIVE | OUTPATIENT
Start: 2021-12-21 | End: 2021-12-21

## 2021-12-21 RX ORDER — LORATADINE 10 MG/1
10 TABLET ORAL DAILY
Status: DISCONTINUED | OUTPATIENT
Start: 2021-12-22 | End: 2021-12-23 | Stop reason: HOSPADM

## 2021-12-21 RX ORDER — DIPHENHYDRAMINE HCL 25 MG
25 TABLET ORAL EVERY 6 HOURS PRN
Status: DISCONTINUED | OUTPATIENT
Start: 2021-12-21 | End: 2021-12-23 | Stop reason: HOSPADM

## 2021-12-21 RX ORDER — DEXAMETHASONE SODIUM PHOSPHATE 4 MG/ML
4 INJECTION, SOLUTION INTRA-ARTICULAR; INTRALESIONAL; INTRAMUSCULAR; INTRAVENOUS; SOFT TISSUE
Status: DISCONTINUED | OUTPATIENT
Start: 2021-12-21 | End: 2021-12-23 | Stop reason: HOSPADM

## 2021-12-21 RX ORDER — DEXMEDETOMIDINE HYDROCHLORIDE 100 UG/ML
INJECTION, SOLUTION INTRAVENOUS PRN
Status: DISCONTINUED | OUTPATIENT
Start: 2021-12-21 | End: 2021-12-21 | Stop reason: SURG

## 2021-12-21 RX ORDER — HYDROMORPHONE HYDROCHLORIDE 1 MG/ML
0.5 INJECTION, SOLUTION INTRAMUSCULAR; INTRAVENOUS; SUBCUTANEOUS
Status: DISCONTINUED | OUTPATIENT
Start: 2021-12-21 | End: 2021-12-23 | Stop reason: HOSPADM

## 2021-12-21 RX ORDER — ONDANSETRON 2 MG/ML
4 INJECTION INTRAMUSCULAR; INTRAVENOUS
Status: COMPLETED | OUTPATIENT
Start: 2021-12-21 | End: 2021-12-21

## 2021-12-21 RX ORDER — ACETAMINOPHEN 500 MG
1000 TABLET ORAL EVERY 6 HOURS
Status: DISCONTINUED | OUTPATIENT
Start: 2021-12-21 | End: 2021-12-23 | Stop reason: HOSPADM

## 2021-12-21 RX ORDER — OXYCODONE HYDROCHLORIDE 10 MG/1
10 TABLET ORAL
Status: DISCONTINUED | OUTPATIENT
Start: 2021-12-21 | End: 2021-12-23 | Stop reason: HOSPADM

## 2021-12-21 RX ORDER — HALOPERIDOL 5 MG/ML
1 INJECTION INTRAMUSCULAR
Status: DISCONTINUED | OUTPATIENT
Start: 2021-12-21 | End: 2021-12-21 | Stop reason: HOSPADM

## 2021-12-21 RX ORDER — CELECOXIB 200 MG/1
200 CAPSULE ORAL ONCE
Status: COMPLETED | OUTPATIENT
Start: 2021-12-21 | End: 2021-12-21

## 2021-12-21 RX ORDER — CALCIUM CARBONATE 500 MG/1
500 TABLET, CHEWABLE ORAL
Status: DISCONTINUED | OUTPATIENT
Start: 2021-12-21 | End: 2021-12-23 | Stop reason: HOSPADM

## 2021-12-21 RX ORDER — DIPHENHYDRAMINE HYDROCHLORIDE 50 MG/ML
25 INJECTION INTRAMUSCULAR; INTRAVENOUS EVERY 6 HOURS PRN
Status: DISCONTINUED | OUTPATIENT
Start: 2021-12-21 | End: 2021-12-23 | Stop reason: HOSPADM

## 2021-12-21 RX ORDER — CEFOTETAN DISODIUM 2 G/20ML
INJECTION, POWDER, FOR SOLUTION INTRAMUSCULAR; INTRAVENOUS PRN
Status: DISCONTINUED | OUTPATIENT
Start: 2021-12-21 | End: 2021-12-21 | Stop reason: SURG

## 2021-12-21 RX ORDER — ONDANSETRON 2 MG/ML
4 INJECTION INTRAMUSCULAR; INTRAVENOUS EVERY 4 HOURS PRN
Status: DISCONTINUED | OUTPATIENT
Start: 2021-12-21 | End: 2021-12-23 | Stop reason: HOSPADM

## 2021-12-21 RX ORDER — METRONIDAZOLE 500 MG/1
TABLET ORAL
Status: ON HOLD | COMMUNITY
Start: 2021-11-29 | End: 2021-12-21

## 2021-12-21 RX ORDER — HYDROMORPHONE HYDROCHLORIDE 1 MG/ML
0.4 INJECTION, SOLUTION INTRAMUSCULAR; INTRAVENOUS; SUBCUTANEOUS
Status: DISCONTINUED | OUTPATIENT
Start: 2021-12-21 | End: 2021-12-21 | Stop reason: HOSPADM

## 2021-12-21 RX ORDER — OXYCODONE HCL 5 MG/5 ML
10 SOLUTION, ORAL ORAL
Status: COMPLETED | OUTPATIENT
Start: 2021-12-21 | End: 2021-12-21

## 2021-12-21 RX ORDER — SCOLOPAMINE TRANSDERMAL SYSTEM 1 MG/1
1 PATCH, EXTENDED RELEASE TRANSDERMAL
Status: DISCONTINUED | OUTPATIENT
Start: 2021-12-21 | End: 2021-12-23 | Stop reason: HOSPADM

## 2021-12-21 RX ORDER — ROCURONIUM BROMIDE 10 MG/ML
INJECTION, SOLUTION INTRAVENOUS PRN
Status: DISCONTINUED | OUTPATIENT
Start: 2021-12-21 | End: 2021-12-21 | Stop reason: SURG

## 2021-12-21 RX ORDER — ONDANSETRON 2 MG/ML
INJECTION INTRAMUSCULAR; INTRAVENOUS PRN
Status: DISCONTINUED | OUTPATIENT
Start: 2021-12-21 | End: 2021-12-21 | Stop reason: SURG

## 2021-12-21 RX ORDER — SODIUM CHLORIDE, SODIUM LACTATE, POTASSIUM CHLORIDE, CALCIUM CHLORIDE 600; 310; 30; 20 MG/100ML; MG/100ML; MG/100ML; MG/100ML
INJECTION, SOLUTION INTRAVENOUS CONTINUOUS
Status: DISCONTINUED | OUTPATIENT
Start: 2021-12-21 | End: 2021-12-21 | Stop reason: HOSPADM

## 2021-12-21 RX ORDER — OXYCODONE HYDROCHLORIDE 5 MG/1
5 TABLET ORAL
Status: DISCONTINUED | OUTPATIENT
Start: 2021-12-21 | End: 2021-12-23 | Stop reason: HOSPADM

## 2021-12-21 RX ORDER — HYDROMORPHONE HYDROCHLORIDE 1 MG/ML
0.2 INJECTION, SOLUTION INTRAMUSCULAR; INTRAVENOUS; SUBCUTANEOUS
Status: DISCONTINUED | OUTPATIENT
Start: 2021-12-21 | End: 2021-12-21 | Stop reason: HOSPADM

## 2021-12-21 RX ORDER — HYDROMORPHONE HYDROCHLORIDE 1 MG/ML
0.1 INJECTION, SOLUTION INTRAMUSCULAR; INTRAVENOUS; SUBCUTANEOUS
Status: DISCONTINUED | OUTPATIENT
Start: 2021-12-21 | End: 2021-12-21 | Stop reason: HOSPADM

## 2021-12-21 RX ORDER — NEOMYCIN SULFATE 500 MG/1
1000 TABLET ORAL SEE ADMIN INSTRUCTIONS
Status: ON HOLD | COMMUNITY
Start: 2021-11-29 | End: 2021-12-26

## 2021-12-21 RX ORDER — MIDAZOLAM HYDROCHLORIDE 1 MG/ML
INJECTION INTRAMUSCULAR; INTRAVENOUS PRN
Status: DISCONTINUED | OUTPATIENT
Start: 2021-12-21 | End: 2021-12-21 | Stop reason: SURG

## 2021-12-21 RX ORDER — HALOPERIDOL 5 MG/ML
1 INJECTION INTRAMUSCULAR EVERY 6 HOURS PRN
Status: DISCONTINUED | OUTPATIENT
Start: 2021-12-21 | End: 2021-12-23 | Stop reason: HOSPADM

## 2021-12-21 RX ORDER — TRAZODONE HYDROCHLORIDE 50 MG/1
50 TABLET ORAL NIGHTLY PRN
Status: DISCONTINUED | OUTPATIENT
Start: 2021-12-21 | End: 2021-12-23 | Stop reason: HOSPADM

## 2021-12-21 RX ORDER — ACETAMINOPHEN 500 MG
1000 TABLET ORAL EVERY 6 HOURS PRN
Status: DISCONTINUED | OUTPATIENT
Start: 2021-12-26 | End: 2021-12-23 | Stop reason: HOSPADM

## 2021-12-21 RX ORDER — HYDRALAZINE HYDROCHLORIDE 20 MG/ML
5 INJECTION INTRAMUSCULAR; INTRAVENOUS
Status: DISCONTINUED | OUTPATIENT
Start: 2021-12-21 | End: 2021-12-21 | Stop reason: HOSPADM

## 2021-12-21 RX ORDER — LABETALOL HYDROCHLORIDE 5 MG/ML
5 INJECTION, SOLUTION INTRAVENOUS
Status: DISCONTINUED | OUTPATIENT
Start: 2021-12-21 | End: 2021-12-21 | Stop reason: HOSPADM

## 2021-12-21 RX ORDER — IPRATROPIUM BROMIDE AND ALBUTEROL SULFATE 2.5; .5 MG/3ML; MG/3ML
3 SOLUTION RESPIRATORY (INHALATION)
Status: DISCONTINUED | OUTPATIENT
Start: 2021-12-21 | End: 2021-12-21 | Stop reason: HOSPADM

## 2021-12-21 RX ORDER — OXYCODONE HCL 5 MG/5 ML
5 SOLUTION, ORAL ORAL
Status: COMPLETED | OUTPATIENT
Start: 2021-12-21 | End: 2021-12-21

## 2021-12-21 RX ORDER — DIPHENHYDRAMINE HYDROCHLORIDE 50 MG/ML
12.5 INJECTION INTRAMUSCULAR; INTRAVENOUS
Status: DISCONTINUED | OUTPATIENT
Start: 2021-12-21 | End: 2021-12-21 | Stop reason: HOSPADM

## 2021-12-21 RX ORDER — DEXTROSE, SODIUM CHLORIDE, SODIUM LACTATE, POTASSIUM CHLORIDE, AND CALCIUM CHLORIDE 5; .6; .31; .03; .02 G/100ML; G/100ML; G/100ML; G/100ML; G/100ML
INJECTION, SOLUTION INTRAVENOUS CONTINUOUS
Status: ACTIVE | OUTPATIENT
Start: 2021-12-21 | End: 2021-12-21

## 2021-12-21 RX ORDER — BUPIVACAINE HYDROCHLORIDE AND EPINEPHRINE 2.5; 5 MG/ML; UG/ML
INJECTION, SOLUTION EPIDURAL; INFILTRATION; INTRACAUDAL; PERINEURAL
Status: COMPLETED | OUTPATIENT
Start: 2021-12-21 | End: 2021-12-21

## 2021-12-21 RX ORDER — DEXAMETHASONE SODIUM PHOSPHATE 4 MG/ML
INJECTION, SOLUTION INTRA-ARTICULAR; INTRALESIONAL; INTRAMUSCULAR; INTRAVENOUS; SOFT TISSUE PRN
Status: DISCONTINUED | OUTPATIENT
Start: 2021-12-21 | End: 2021-12-21 | Stop reason: SURG

## 2021-12-21 RX ORDER — OMEPRAZOLE 20 MG/1
20 CAPSULE, DELAYED RELEASE ORAL DAILY
Status: DISCONTINUED | OUTPATIENT
Start: 2021-12-22 | End: 2021-12-23 | Stop reason: HOSPADM

## 2021-12-21 RX ORDER — MEPERIDINE HYDROCHLORIDE 25 MG/ML
12.5 INJECTION INTRAMUSCULAR; INTRAVENOUS; SUBCUTANEOUS
Status: DISCONTINUED | OUTPATIENT
Start: 2021-12-21 | End: 2021-12-21 | Stop reason: HOSPADM

## 2021-12-21 RX ADMIN — ONDANSETRON 4 MG: 2 INJECTION INTRAMUSCULAR; INTRAVENOUS at 13:04

## 2021-12-21 RX ADMIN — SODIUM CHLORIDE, SODIUM LACTATE, POTASSIUM CHLORIDE, CALCIUM CHLORIDE AND DEXTROSE MONOHYDRATE: 5; 600; 310; 30; 20 INJECTION, SOLUTION INTRAVENOUS at 14:13

## 2021-12-21 RX ADMIN — ACETAMINOPHEN 1000 MG: 500 TABLET ORAL at 23:45

## 2021-12-21 RX ADMIN — DEXAMETHASONE SODIUM PHOSPHATE 8 MG: 4 INJECTION, SOLUTION INTRA-ARTICULAR; INTRALESIONAL; INTRAMUSCULAR; INTRAVENOUS; SOFT TISSUE at 10:26

## 2021-12-21 RX ADMIN — PROPOFOL 200 MG: 10 INJECTION, EMULSION INTRAVENOUS at 10:08

## 2021-12-21 RX ADMIN — HYDROMORPHONE HYDROCHLORIDE 0.4 MG: 1 INJECTION, SOLUTION INTRAMUSCULAR; INTRAVENOUS; SUBCUTANEOUS at 13:13

## 2021-12-21 RX ADMIN — SUGAMMADEX 200 MG: 100 INJECTION, SOLUTION INTRAVENOUS at 11:15

## 2021-12-21 RX ADMIN — FENTANYL CITRATE 50 MCG: 50 INJECTION, SOLUTION INTRAMUSCULAR; INTRAVENOUS at 11:15

## 2021-12-21 RX ADMIN — SODIUM CHLORIDE, POTASSIUM CHLORIDE, SODIUM LACTATE AND CALCIUM CHLORIDE: 600; 310; 30; 20 INJECTION, SOLUTION INTRAVENOUS at 11:50

## 2021-12-21 RX ADMIN — DEXMEDETOMIDINE 20 MCG: 200 INJECTION, SOLUTION INTRAVENOUS at 10:42

## 2021-12-21 RX ADMIN — ONDANSETRON 4 MG: 2 INJECTION INTRAMUSCULAR; INTRAVENOUS at 11:15

## 2021-12-21 RX ADMIN — CELECOXIB 200 MG: 200 CAPSULE ORAL at 09:27

## 2021-12-21 RX ADMIN — FENTANYL CITRATE 50 MCG: 50 INJECTION, SOLUTION INTRAMUSCULAR; INTRAVENOUS at 10:53

## 2021-12-21 RX ADMIN — ACETAMINOPHEN 1000 MG: 500 TABLET ORAL at 09:27

## 2021-12-21 RX ADMIN — SCOPALAMINE 1 PATCH: 1 PATCH, EXTENDED RELEASE TRANSDERMAL at 17:27

## 2021-12-21 RX ADMIN — FENTANYL CITRATE 50 MCG: 50 INJECTION, SOLUTION INTRAMUSCULAR; INTRAVENOUS at 11:59

## 2021-12-21 RX ADMIN — FENTANYL CITRATE 50 MCG: 50 INJECTION, SOLUTION INTRAMUSCULAR; INTRAVENOUS at 10:06

## 2021-12-21 RX ADMIN — FENTANYL CITRATE 50 MCG: 50 INJECTION, SOLUTION INTRAMUSCULAR; INTRAVENOUS at 11:09

## 2021-12-21 RX ADMIN — FENTANYL CITRATE 50 MCG: 50 INJECTION, SOLUTION INTRAMUSCULAR; INTRAVENOUS at 11:48

## 2021-12-21 RX ADMIN — ROCURONIUM BROMIDE 50 MG: 10 INJECTION, SOLUTION INTRAVENOUS at 10:08

## 2021-12-21 RX ADMIN — HYDROMORPHONE HYDROCHLORIDE 0.2 MG: 1 INJECTION, SOLUTION INTRAMUSCULAR; INTRAVENOUS; SUBCUTANEOUS at 13:18

## 2021-12-21 RX ADMIN — HYDROMORPHONE HYDROCHLORIDE 0.2 MG: 1 INJECTION, SOLUTION INTRAMUSCULAR; INTRAVENOUS; SUBCUTANEOUS at 12:24

## 2021-12-21 RX ADMIN — BUPIVACAINE HYDROCHLORIDE AND EPINEPHRINE 30 ML: 2.5; 5 INJECTION, SOLUTION EPIDURAL; INFILTRATION; INTRACAUDAL; PERINEURAL at 10:16

## 2021-12-21 RX ADMIN — FENTANYL CITRATE 50 MCG: 50 INJECTION, SOLUTION INTRAMUSCULAR; INTRAVENOUS at 10:14

## 2021-12-21 RX ADMIN — MIDAZOLAM HYDROCHLORIDE 2 MG: 1 INJECTION, SOLUTION INTRAMUSCULAR; INTRAVENOUS at 10:04

## 2021-12-21 RX ADMIN — HYDROMORPHONE HYDROCHLORIDE 0.4 MG: 1 INJECTION, SOLUTION INTRAMUSCULAR; INTRAVENOUS; SUBCUTANEOUS at 13:07

## 2021-12-21 RX ADMIN — LIDOCAINE HYDROCHLORIDE 0.5 ML: 10 INJECTION, SOLUTION EPIDURAL; INFILTRATION; INTRACAUDAL; PERINEURAL at 09:26

## 2021-12-21 RX ADMIN — SODIUM CHLORIDE, POTASSIUM CHLORIDE, SODIUM LACTATE AND CALCIUM CHLORIDE: 600; 310; 30; 20 INJECTION, SOLUTION INTRAVENOUS at 09:27

## 2021-12-21 RX ADMIN — OXYCODONE HYDROCHLORIDE 10 MG: 5 SOLUTION ORAL at 11:46

## 2021-12-21 RX ADMIN — CEFOTETAN DISODIUM 2 G: 2 INJECTION, POWDER, FOR SOLUTION INTRAMUSCULAR; INTRAVENOUS at 10:08

## 2021-12-21 RX ADMIN — ROCURONIUM BROMIDE 20 MG: 10 INJECTION, SOLUTION INTRAVENOUS at 10:30

## 2021-12-21 RX ADMIN — HYDROMORPHONE HYDROCHLORIDE 0.4 MG: 1 INJECTION, SOLUTION INTRAMUSCULAR; INTRAVENOUS; SUBCUTANEOUS at 12:11

## 2021-12-21 RX ADMIN — HYDROMORPHONE HYDROCHLORIDE 0.4 MG: 1 INJECTION, SOLUTION INTRAMUSCULAR; INTRAVENOUS; SUBCUTANEOUS at 12:19

## 2021-12-21 RX ADMIN — DEXMEDETOMIDINE 25 MCG: 200 INJECTION, SOLUTION INTRAVENOUS at 10:49

## 2021-12-21 ASSESSMENT — COGNITIVE AND FUNCTIONAL STATUS - GENERAL
CLIMB 3 TO 5 STEPS WITH RAILING: A LITTLE
SUGGESTED CMS G CODE MODIFIER MOBILITY: CK
DRESSING REGULAR UPPER BODY CLOTHING: A LITTLE
DAILY ACTIVITIY SCORE: 20
STANDING UP FROM CHAIR USING ARMS: A LITTLE
MOBILITY SCORE: 18
MOVING FROM LYING ON BACK TO SITTING ON SIDE OF FLAT BED: A LITTLE
WALKING IN HOSPITAL ROOM: A LITTLE
HELP NEEDED FOR BATHING: A LITTLE
TOILETING: A LITTLE
TURNING FROM BACK TO SIDE WHILE IN FLAT BAD: A LITTLE
MOVING TO AND FROM BED TO CHAIR: A LITTLE
SUGGESTED CMS G CODE MODIFIER DAILY ACTIVITY: CJ
DRESSING REGULAR LOWER BODY CLOTHING: A LITTLE

## 2021-12-21 ASSESSMENT — PAIN DESCRIPTION - PAIN TYPE
TYPE: ACUTE PAIN;SURGICAL PAIN

## 2021-12-21 ASSESSMENT — PAIN SCALES - GENERAL: PAIN_LEVEL: 4

## 2021-12-21 ASSESSMENT — PATIENT HEALTH QUESTIONNAIRE - PHQ9
1. LITTLE INTEREST OR PLEASURE IN DOING THINGS: NOT AT ALL
SUM OF ALL RESPONSES TO PHQ9 QUESTIONS 1 AND 2: 0
2. FEELING DOWN, DEPRESSED, IRRITABLE, OR HOPELESS: NOT AT ALL

## 2021-12-21 ASSESSMENT — FIBROSIS 4 INDEX: FIB4 SCORE: 0.81

## 2021-12-21 ASSESSMENT — LIFESTYLE VARIABLES
EVER FELT BAD OR GUILTY ABOUT YOUR DRINKING: NO
TOTAL SCORE: 0
ALCOHOL_USE: YES
TOTAL SCORE: 0
EVER HAD A DRINK FIRST THING IN THE MORNING TO STEADY YOUR NERVES TO GET RID OF A HANGOVER: NO
AVERAGE NUMBER OF DAYS PER WEEK YOU HAVE A DRINK CONTAINING ALCOHOL: 1
DOES PATIENT WANT TO STOP DRINKING: NO
ON A TYPICAL DAY WHEN YOU DRINK ALCOHOL HOW MANY DRINKS DO YOU HAVE: 2
TOTAL SCORE: 0
HOW MANY TIMES IN THE PAST YEAR HAVE YOU HAD 5 OR MORE DRINKS IN A DAY: 0
CONSUMPTION TOTAL: NEGATIVE
HAVE YOU EVER FELT YOU SHOULD CUT DOWN ON YOUR DRINKING: NO
HAVE PEOPLE ANNOYED YOU BY CRITICIZING YOUR DRINKING: NO

## 2021-12-21 NOTE — ANESTHESIA PREPROCEDURE EVALUATION
Case: [id number] Date/Time: 12/21/21 0945    Procedure: CREATION, ILEOSTOMY - DIVERTING LOOP    Pre-op diagnosis: ILEOSTOMY PRESENT    Location: [facility] 09 / SURGERY [facility]    Surgeons: Damian Camacho M.D.          Relevant Problems   No relevant active problems       Physical Exam    Airway   Mallampati: II  TM distance: >3 FB  Neck ROM: full       Cardiovascular - normal exam  Rhythm: regular  Rate: normal  (-) murmur     Dental - normal exam             Pulmonary - normal exam  Breath sounds clear to auscultation     Abdominal    Neurological - normal exam               Anesthesia Plan    ASA 2       Plan - general and peripheral nerve block     Peripheral nerve block will be post-op pain control  Airway plan will be ETT          Induction: intravenous    Postoperative Plan: Postoperative administration of opioids is intended.    Pertinent diagnostic labs and testing reviewed    Informed Consent:    Anesthetic plan and risks discussed with patient.    Use of blood products discussed with: patient whom consented to blood products.

## 2021-12-21 NOTE — ANESTHESIA TIME REPORT
Anesthesia Start and Stop Event Times     Date Time Event    12/21/2021 0942 Ready for Procedure     1002 Anesthesia Start     1126 Anesthesia Stop        Responsible Staff  12/21/21    Name Role Begin End    Dalia Covington M.D. Anesth 1002 1126        Preop Diagnosis (Free Text):  Pre-op Diagnosis     ILEOSTOMY PRESENT        Preop Diagnosis (Codes):    Premium Reason  Non-Premium    Comments:

## 2021-12-21 NOTE — OR NURSING
1203: pt's wife Vani phoned and updated on pt status in Recovery.  Vani is in the surgical waiting area.  1309: pt's wife phoned and updated on pt status in Recovery and transfer to [id number].  1336: pt's wife phoned and updated on pt status and transfer to room.

## 2021-12-21 NOTE — ANESTHESIA PROCEDURE NOTES
Airway    Date/Time: 12/21/2021 10:09 AM  Performed by: Dalia Covington M.D.  Authorized by: Dalia Covington M.D.     Location:  OR  Urgency:  Elective  Difficult Airway: No    Indications for Airway Management:  Anesthesia      Spontaneous Ventilation: absent    Sedation Level:  Deep  Preoxygenated: Yes    Patient Position:  Sniffing  Mask Difficulty Assessment:  1 - vent by mask  Final Airway Type:  Endotracheal airway  Final Endotracheal Airway:  ETT  Cuffed: Yes    Technique Used for Successful ETT Placement:  Direct laryngoscopy    Insertion Site:  Oral  Blade Type:  Talita  Laryngoscope Blade/Videolaryngoscope Blade Size:  4  ETT Size (mm):  7.5  Measured from:  Teeth  ETT to Teeth (cm):  23  Placement Verified by: auscultation and capnometry    Cormack-Lehane Classification:  Grade I - full view of glottis  Number of Attempts at Approach:  1

## 2021-12-21 NOTE — PROGRESS NOTES
Patient here from PACU.  ERAS protocol in place.  Patient complains of minimal pain and has ice pack in place. Declines other interventions at this time.   Patient states that he is intermittently nauseous, utilizing aromatherapy to ease symptoms, declines other interventions at this time.

## 2021-12-21 NOTE — PROGRESS NOTES
4 Eyes Skin Assessment Completed by HIRO Mejias and HIRO Gomez.    Head WDL  Ears WDL  Nose WDL  Mouth WDL  Neck WDL  Breast/Chest WDL  Shoulder Blades WDL  Spine WDL  (R) Arm/Elbow/Hand WDL  (L) Arm/Elbow/Hand WDL  Abdomen Incision RLQ incision, dressed with gauze and hypafix tape. Old shadowing outlined.   Groin WDL  Scrotum/Coccyx/Buttocks WDL  (R) Leg WDL  (L) Leg WDL  (R) Heel/Foot/Toe WDL  (L) Heel/Foot/Toe WDL          Devices In Places SCD's and Oxy Mask      Interventions In Place Pressure Redistribution Mattress    Possible Skin Injury No    Pictures Uploaded Into Epic N/A  Wound Consult Placed N/A  RN Wound Prevention Protocol Ordered No

## 2021-12-21 NOTE — PROGRESS NOTES
Patient in pre-op, assessment completed, patient and wife nayana updated on plan of care, all questions answered, no further needs at this time, call light within reach.

## 2021-12-21 NOTE — PROGRESS NOTES
Med rec updated and complete, per pt  Allergies reviewed, per pt  Interviewed pt with wife at bedside with permission from pt.

## 2021-12-21 NOTE — OR NURSING
Pt on 10 L oxy-mask per ERAS order.  Six hours complete at 1800.  Nausea treated with Zofran.  Tolerating PO fluids/juice and liquid pain medication.  Abdominal surgical site CDI, ice pack in place.  Pt received Bilateral TAP Blocks.  Surgical pain now tolerable per pt, 4/10.  VSS, afebrile, PENN, A/O x4.  Pt sat EOB, tolerated well.     One clear pt belonging bag at bedside.   Personal mask in place.     Oxygen tank 100% full.

## 2021-12-21 NOTE — OP REPORT
Operative Report    Date: 12/21/2021    Surgeon: Damian Camacho M.D.    Assistant:  Maddison CHOI  The indications for a surgical assistant in this surgery were indicated due to complexity of the procedure. Their role included aiding in incision, retraction, holding devices including camera for laparoscopic procedure, and closure of the wound.      Anesthesiologist: Dr Covington    Pre-operative Diagnosis: ileostomy in place    Post-operative Diagnosis: same     Procedure: ileostomy reversal    Indications: The patient is a 61 y.o. male who has an ileostomy and desires reversal.  Preoperative workup including a flexible sigmoidoscopy and contrast enema evealed no contraindications to reversal.    An extensive PARQ conference was held with the patient and his family, in regard to risks. The patient was counseled regarding the benefits of the operation, namely, restoration of continence. The patient was made aware of the alternatives, including operative and non-operative: continued ostomy care. The risks of bleeding, infection, damage to surrounding structures, need for reoperation, hernia, fistula, leak, stroke, MI, and death were discussed with the patient. The patient was given a chance to ask questions, and all his questions were answered. The patient demonstrates adequate understanding, seems pleased with the plan, and wishes to proceed.    Findings:  Ileostomy in place    Procedure in detail: The patient was identified in the pre-operative holding area and brought to the operating room. Correct side and site were identified. Pre-operative antibiotics of cefotetan were administered prior to the procedure. GETA was smoothly induced. The patient was prepped and draped in the usual sterile fashion.    I began by incising the mucocutaneous junction with 15 blade scalpel.  Traction was placed on the skin stabilizing the area and everting the stoma.   Using Allis forceps applied to the rim of the skin  attached the stoma and countertraction with right angle retractors in the subcutaneous space the dissection proceeded until the stoma was free of the surrounding abdominal wall fat.  Using a combination of sharp dissection with Metzenbaum and electrocautery avoiding damage to the bowel  And mesentery the peritoneal cavity was entered.   The surgeon's index finger was inserted through the opening sweeping around the anterior peritoneal surface freeing the filmy adhesions between the bowel and peritoneum.  Fibrous adhesions were hooked under the finger and divided with electrocautery under direct visualization.  Once the stoma was fully mobilized I reversed the eversion of the distal stoma and excised all of the skin and scarring back to healthy bowel.    A stapled closure was then performed  As an adequate length of small bowel was able to be delivered from the abdominal cavity.   The open ends of the stoma were stapled closed with a linear cutting stapler and the antimesenteric staple line was opened allowing entry of either limb of the linear cutting stapler.  The mesenteric aspect of the bowel was rotated out of the staple line using DeBakey forceps.  The stapler was fired and the staple line was inspected for hemostasis. A 3-0 Vicryl suture was placed at the joining of the 2 limbs of bowel at the proximal most staple line.  The common enterotomy was then closed with the linear stapler.  The staple line was inspected for hemostasis and oversewn with a 3-0 Vicryl suture.  The bowel was then placed in the abdomen  And the peritoneal sac was excised before closing the fascial and muscle layers using #1 PDS. This and the space was closed with 3-0 Vicryl sutures in an interrupted fashion.  Skin was loosely approximated with a 4-0 Monocryl. A dry sterile dressing was applied.  The patient was awakened from general anesthetic, and was taken to the recovery room in stable condition.    Sponge and needle counts were  correct at the end of the case.     Specimen: ileostomy ends    EBL: 50 mL    UOP: not recorded    Drains: none    Dispo: to PACU    Damian Camacho MD PhD  [facility]  Colon and Rectal Surgeon  [phone]

## 2021-12-21 NOTE — ANESTHESIA PROCEDURE NOTES
Peripheral Block    Date/Time: 12/21/2021 10:16 AM  Performed by: Dalia Covington M.D.  Authorized by: Dalia Covington M.D.     Patient Location:  OR  Start Time:  12/21/2021 10:16 AM  End Time:  12/21/2021 10:22 AM  Reason for Block: at surgeon's request and post-op pain management ONLY    patient identified, IV checked, site marked, risks and benefits discussed, surgical consent, monitors and equipment checked, pre-op evaluation and timeout performed    Patient Position:  Supine  Prep: ChloraPrep    Monitoring:  Heart rate, continuous pulse ox and cardiac monitor  Block Region:  Trunk  Trunk - Block Type:  Abdominal plane block - TAP block    Laterality:  Bilateral  Procedures: ultrasound guided  Image captured, interpreted and electronically stored.  Strength:  1 %  Dose:  3 ml  Block Type:  Single-shot  Needle Length:  100mm  Needle Gauge:  21 G  Needle Localization:  Ultrasound guidance  Injection Assessment:  Negative aspiration for heme, no paresthesia on injection, incremental injection and local visualized surrounding nerve on ultrasound  Evidence of intravascular injection: No     15 mL injected on each side after negative aspiration.

## 2021-12-22 LAB
ANION GAP SERPL CALC-SCNC: 11 MMOL/L (ref 7–16)
BUN SERPL-MCNC: 12 MG/DL (ref 8–22)
CALCIUM SERPL-MCNC: 9.2 MG/DL (ref 8.5–10.5)
CHLORIDE SERPL-SCNC: 99 MMOL/L (ref 96–112)
CO2 SERPL-SCNC: 24 MMOL/L (ref 20–33)
CREAT SERPL-MCNC: 0.84 MG/DL (ref 0.5–1.4)
ERYTHROCYTE [DISTWIDTH] IN BLOOD BY AUTOMATED COUNT: 42.7 FL (ref 35.9–50)
GLUCOSE SERPL-MCNC: 139 MG/DL (ref 65–99)
HCT VFR BLD AUTO: 43.4 % (ref 42–52)
HGB BLD-MCNC: 14.7 G/DL (ref 14–18)
MAGNESIUM SERPL-MCNC: 1.9 MG/DL (ref 1.5–2.5)
MCH RBC QN AUTO: 28.4 PG (ref 27–33)
MCHC RBC AUTO-ENTMCNC: 33.9 G/DL (ref 33.7–35.3)
MCV RBC AUTO: 83.8 FL (ref 81.4–97.8)
PLATELET # BLD AUTO: 283 K/UL (ref 164–446)
PMV BLD AUTO: 9.5 FL (ref 9–12.9)
POTASSIUM SERPL-SCNC: 4.1 MMOL/L (ref 3.6–5.5)
RBC # BLD AUTO: 5.18 M/UL (ref 4.7–6.1)
SODIUM SERPL-SCNC: 134 MMOL/L (ref 135–145)
WBC # BLD AUTO: 15.3 K/UL (ref 4.8–10.8)

## 2021-12-22 PROCEDURE — 94760 N-INVAS EAR/PLS OXIMETRY 1: CPT

## 2021-12-22 PROCEDURE — 36415 COLL VENOUS BLD VENIPUNCTURE: CPT

## 2021-12-22 PROCEDURE — 85027 COMPLETE CBC AUTOMATED: CPT

## 2021-12-22 PROCEDURE — 80048 BASIC METABOLIC PNL TOTAL CA: CPT

## 2021-12-22 PROCEDURE — A9270 NON-COVERED ITEM OR SERVICE: HCPCS | Performed by: NURSE PRACTITIONER

## 2021-12-22 PROCEDURE — 700111 HCHG RX REV CODE 636 W/ 250 OVERRIDE (IP): Performed by: NURSE PRACTITIONER

## 2021-12-22 PROCEDURE — 83735 ASSAY OF MAGNESIUM: CPT

## 2021-12-22 PROCEDURE — 700102 HCHG RX REV CODE 250 W/ 637 OVERRIDE(OP): Performed by: NURSE PRACTITIONER

## 2021-12-22 PROCEDURE — 770001 HCHG ROOM/CARE - MED/SURG/GYN PRIV*

## 2021-12-22 RX ADMIN — ENOXAPARIN SODIUM 40 MG: 40 INJECTION SUBCUTANEOUS at 09:23

## 2021-12-22 RX ADMIN — ACETAMINOPHEN 1000 MG: 500 TABLET ORAL at 05:04

## 2021-12-22 RX ADMIN — ACETAMINOPHEN 1000 MG: 500 TABLET ORAL at 17:25

## 2021-12-22 RX ADMIN — CELECOXIB 200 MG: 200 CAPSULE ORAL at 17:24

## 2021-12-22 RX ADMIN — OMEPRAZOLE 20 MG: 20 CAPSULE, DELAYED RELEASE ORAL at 05:04

## 2021-12-22 RX ADMIN — ACETAMINOPHEN 1000 MG: 500 TABLET ORAL at 12:17

## 2021-12-22 RX ADMIN — CELECOXIB 200 MG: 200 CAPSULE ORAL at 05:04

## 2021-12-22 RX ADMIN — LORATADINE 10 MG: 10 TABLET ORAL at 05:05

## 2021-12-22 ASSESSMENT — PAIN DESCRIPTION - PAIN TYPE
TYPE: ACUTE PAIN

## 2021-12-22 NOTE — PROGRESS NOTES
Pt A&Ox4  Patient answers all questions appropriately and demonstrates proper use of call light     Pain managed with prescribed medications     Patient having multiple episodes of watery emesis, scop patch in place, patient declines additional medication interventions at this time.  + bowel sounds +flatus, patient had one small bowel movement 12/21  +void     Saturating >90% on room air   Denies SOB     Pt ambulates independently with a steady gait     Updated on plan of care. Safety education provided. Bed locked in low. Call light within reach. Rounding in place.

## 2021-12-22 NOTE — CARE PLAN
The patient is Stable - Low risk of patient condition declining or worsening    Shift Goals  Clinical Goals: Pain management, Rest  Patient Goals: Pain Management, Rest     Progress made toward(s) clinical / shift goals:      Problem: Pain - Standard  Goal: Alleviation of pain or a reduction in pain to the patient’s comfort goal  Outcome: Progressing  Note: Pain Managed with prescribed medications and nonpharmalogical pain interventions      Problem: Knowledge Deficit - Standard  Goal: Patient and family/care givers will demonstrate understanding of plan of care, disease process/condition, diagnostic tests and medications  Outcome: Progressing  Note: All questions and concerns addressed with patient        Patient is not progressing towards the following goals:

## 2021-12-22 NOTE — PROGRESS NOTES
Received report from HIRO Schwartz  Pt is AAOx4  PENN   VSS  Denies pain   Denies SOB  -N/V; scope patch in place  Tolerating diet  +BS -Flatus   +Void  RLQ transverse dressing; scant dried drainage noted  Pt up self w/ steady gait  POC discussed  Bed locked and in thew lowest position  Call light w/in reach  Hourly rounding in place

## 2021-12-22 NOTE — CARE PLAN
The patient is Stable - Low risk of patient condition declining or worsening    Shift Goals  Clinical Goals: Ambulation and return of bowel function   Patient Goals: Pain Management, Rest     Progress made toward(s) clinical / shift goals:  Pt ambulating frequently     Patient is not progressing towards the following goals:      Problem: Knowledge Deficit - Standard  Goal: Patient and family/care givers will demonstrate understanding of plan of care, disease process/condition, diagnostic tests and medications  Outcome: Progressing

## 2021-12-23 ENCOUNTER — HOSPITAL ENCOUNTER (INPATIENT)
Facility: MEDICAL CENTER | Age: 61
LOS: 3 days | DRG: 920 | End: 2021-12-26
Attending: EMERGENCY MEDICINE | Admitting: STUDENT IN AN ORGANIZED HEALTH CARE EDUCATION/TRAINING PROGRAM
Payer: COMMERCIAL

## 2021-12-23 ENCOUNTER — PATIENT OUTREACH (OUTPATIENT)
Dept: HEALTH INFORMATION MANAGEMENT | Facility: OTHER | Age: 61
End: 2021-12-23

## 2021-12-23 ENCOUNTER — APPOINTMENT (OUTPATIENT)
Dept: RADIOLOGY | Facility: MEDICAL CENTER | Age: 61
DRG: 920 | End: 2021-12-23
Attending: EMERGENCY MEDICINE
Payer: COMMERCIAL

## 2021-12-23 VITALS
OXYGEN SATURATION: 95 % | RESPIRATION RATE: 18 BRPM | HEART RATE: 77 BPM | SYSTOLIC BLOOD PRESSURE: 117 MMHG | BODY MASS INDEX: 30.86 KG/M2 | DIASTOLIC BLOOD PRESSURE: 84 MMHG | WEIGHT: 208.34 LBS | HEIGHT: 69 IN | TEMPERATURE: 97.8 F

## 2021-12-23 PROBLEM — Z98.890: Status: ACTIVE | Noted: 2021-12-23

## 2021-12-23 PROBLEM — R71.0: Status: ACTIVE | Noted: 2021-12-23

## 2021-12-23 PROBLEM — Z93.2 ILEOSTOMY PRESENT (HCC): Status: ACTIVE | Noted: 2021-12-23

## 2021-12-23 LAB
ABO + RH BLD: NORMAL
ABO GROUP BLD: NORMAL
ALBUMIN SERPL BCP-MCNC: 4.2 G/DL (ref 3.2–4.9)
ALBUMIN/GLOB SERPL: 1.3 G/DL
ALP SERPL-CCNC: 72 U/L (ref 30–99)
ALT SERPL-CCNC: 45 U/L (ref 2–50)
ANION GAP SERPL CALC-SCNC: 14 MMOL/L (ref 7–16)
ANION GAP SERPL CALC-SCNC: 17 MMOL/L (ref 7–16)
APTT PPP: 28.7 SEC (ref 24.7–36)
AST SERPL-CCNC: 22 U/L (ref 12–45)
BASOPHILS # BLD AUTO: 0.4 % (ref 0–1.8)
BASOPHILS # BLD: 0.07 K/UL (ref 0–0.12)
BILIRUB SERPL-MCNC: 0.9 MG/DL (ref 0.1–1.5)
BLD GP AB SCN SERPL QL: NORMAL
BUN SERPL-MCNC: 14 MG/DL (ref 8–22)
BUN SERPL-MCNC: 15 MG/DL (ref 8–22)
CALCIUM SERPL-MCNC: 9 MG/DL (ref 8.5–10.5)
CALCIUM SERPL-MCNC: 9.3 MG/DL (ref 8.5–10.5)
CHLORIDE SERPL-SCNC: 100 MMOL/L (ref 96–112)
CHLORIDE SERPL-SCNC: 103 MMOL/L (ref 96–112)
CO2 SERPL-SCNC: 17 MMOL/L (ref 20–33)
CO2 SERPL-SCNC: 22 MMOL/L (ref 20–33)
CREAT SERPL-MCNC: 0.79 MG/DL (ref 0.5–1.4)
CREAT SERPL-MCNC: 0.89 MG/DL (ref 0.5–1.4)
EOSINOPHIL # BLD AUTO: 0.01 K/UL (ref 0–0.51)
EOSINOPHIL NFR BLD: 0.1 % (ref 0–6.9)
ERYTHROCYTE [DISTWIDTH] IN BLOOD BY AUTOMATED COUNT: 42.5 FL (ref 35.9–50)
ERYTHROCYTE [DISTWIDTH] IN BLOOD BY AUTOMATED COUNT: 43.1 FL (ref 35.9–50)
GLOBULIN SER CALC-MCNC: 3.2 G/DL (ref 1.9–3.5)
GLUCOSE SERPL-MCNC: 117 MG/DL (ref 65–99)
GLUCOSE SERPL-MCNC: 174 MG/DL (ref 65–99)
HCT VFR BLD AUTO: 41.4 % (ref 42–52)
HCT VFR BLD AUTO: 43.1 % (ref 42–52)
HGB BLD-MCNC: 14.1 G/DL (ref 14–18)
HGB BLD-MCNC: 14.4 G/DL (ref 14–18)
IMM GRANULOCYTES # BLD AUTO: 0.13 K/UL (ref 0–0.11)
IMM GRANULOCYTES NFR BLD AUTO: 0.8 % (ref 0–0.9)
INR PPP: 1.15 (ref 0.87–1.13)
LIPASE SERPL-CCNC: 39 U/L (ref 11–82)
LYMPHOCYTES # BLD AUTO: 2.26 K/UL (ref 1–4.8)
LYMPHOCYTES NFR BLD: 13.4 % (ref 22–41)
MCH RBC QN AUTO: 27.9 PG (ref 27–33)
MCH RBC QN AUTO: 28.3 PG (ref 27–33)
MCHC RBC AUTO-ENTMCNC: 33.4 G/DL (ref 33.7–35.3)
MCHC RBC AUTO-ENTMCNC: 34.1 G/DL (ref 33.7–35.3)
MCV RBC AUTO: 83 FL (ref 81.4–97.8)
MCV RBC AUTO: 83.5 FL (ref 81.4–97.8)
MONOCYTES # BLD AUTO: 1.83 K/UL (ref 0–0.85)
MONOCYTES NFR BLD AUTO: 10.9 % (ref 0–13.4)
NEUTROPHILS # BLD AUTO: 12.52 K/UL (ref 1.82–7.42)
NEUTROPHILS NFR BLD: 74.4 % (ref 44–72)
NRBC # BLD AUTO: 0 K/UL
NRBC BLD-RTO: 0 /100 WBC
PLATELET # BLD AUTO: 266 K/UL (ref 164–446)
PLATELET # BLD AUTO: 392 K/UL (ref 164–446)
PMV BLD AUTO: 9.6 FL (ref 9–12.9)
PMV BLD AUTO: 9.7 FL (ref 9–12.9)
POTASSIUM SERPL-SCNC: 3.8 MMOL/L (ref 3.6–5.5)
POTASSIUM SERPL-SCNC: 3.9 MMOL/L (ref 3.6–5.5)
PROT SERPL-MCNC: 7.4 G/DL (ref 6–8.2)
PROTHROMBIN TIME: 14.4 SEC (ref 12–14.6)
RBC # BLD AUTO: 4.99 M/UL (ref 4.7–6.1)
RBC # BLD AUTO: 5.16 M/UL (ref 4.7–6.1)
RH BLD: NORMAL
SODIUM SERPL-SCNC: 136 MMOL/L (ref 135–145)
SODIUM SERPL-SCNC: 137 MMOL/L (ref 135–145)
WBC # BLD AUTO: 12.1 K/UL (ref 4.8–10.8)
WBC # BLD AUTO: 16.8 K/UL (ref 4.8–10.8)

## 2021-12-23 PROCEDURE — 85027 COMPLETE CBC AUTOMATED: CPT

## 2021-12-23 PROCEDURE — 80048 BASIC METABOLIC PNL TOTAL CA: CPT

## 2021-12-23 PROCEDURE — 80053 COMPREHEN METABOLIC PANEL: CPT

## 2021-12-23 PROCEDURE — 700102 HCHG RX REV CODE 250 W/ 637 OVERRIDE(OP): Performed by: NURSE PRACTITIONER

## 2021-12-23 PROCEDURE — 85610 PROTHROMBIN TIME: CPT

## 2021-12-23 PROCEDURE — 86900 BLOOD TYPING SEROLOGIC ABO: CPT

## 2021-12-23 PROCEDURE — 770004 HCHG ROOM/CARE - ONCOLOGY PRIVATE *

## 2021-12-23 PROCEDURE — 99285 EMERGENCY DEPT VISIT HI MDM: CPT

## 2021-12-23 PROCEDURE — 71045 X-RAY EXAM CHEST 1 VIEW: CPT

## 2021-12-23 PROCEDURE — 85025 COMPLETE CBC W/AUTO DIFF WBC: CPT

## 2021-12-23 PROCEDURE — A9270 NON-COVERED ITEM OR SERVICE: HCPCS | Performed by: NURSE PRACTITIONER

## 2021-12-23 PROCEDURE — 86901 BLOOD TYPING SEROLOGIC RH(D): CPT

## 2021-12-23 PROCEDURE — 99223 1ST HOSP IP/OBS HIGH 75: CPT | Performed by: STUDENT IN AN ORGANIZED HEALTH CARE EDUCATION/TRAINING PROGRAM

## 2021-12-23 PROCEDURE — 85730 THROMBOPLASTIN TIME PARTIAL: CPT

## 2021-12-23 PROCEDURE — 36415 COLL VENOUS BLD VENIPUNCTURE: CPT

## 2021-12-23 PROCEDURE — 700105 HCHG RX REV CODE 258: Performed by: EMERGENCY MEDICINE

## 2021-12-23 PROCEDURE — 700111 HCHG RX REV CODE 636 W/ 250 OVERRIDE (IP): Performed by: NURSE PRACTITIONER

## 2021-12-23 PROCEDURE — 86850 RBC ANTIBODY SCREEN: CPT

## 2021-12-23 PROCEDURE — 83690 ASSAY OF LIPASE: CPT

## 2021-12-23 RX ORDER — ONDANSETRON 4 MG/1
4 TABLET, ORALLY DISINTEGRATING ORAL EVERY 4 HOURS PRN
Status: DISCONTINUED | OUTPATIENT
Start: 2021-12-23 | End: 2021-12-26 | Stop reason: HOSPADM

## 2021-12-23 RX ORDER — ACETAMINOPHEN 325 MG/1
650 TABLET ORAL EVERY 6 HOURS PRN
Status: DISCONTINUED | OUTPATIENT
Start: 2021-12-23 | End: 2021-12-26 | Stop reason: HOSPADM

## 2021-12-23 RX ORDER — LABETALOL HYDROCHLORIDE 5 MG/ML
10 INJECTION, SOLUTION INTRAVENOUS EVERY 4 HOURS PRN
Status: DISCONTINUED | OUTPATIENT
Start: 2021-12-23 | End: 2021-12-24

## 2021-12-23 RX ORDER — PROMETHAZINE HYDROCHLORIDE 25 MG/1
12.5-25 SUPPOSITORY RECTAL EVERY 4 HOURS PRN
Status: DISCONTINUED | OUTPATIENT
Start: 2021-12-23 | End: 2021-12-26 | Stop reason: HOSPADM

## 2021-12-23 RX ORDER — BISACODYL 10 MG
10 SUPPOSITORY, RECTAL RECTAL
Status: DISCONTINUED | OUTPATIENT
Start: 2021-12-23 | End: 2021-12-24

## 2021-12-23 RX ORDER — PROCHLORPERAZINE EDISYLATE 5 MG/ML
5-10 INJECTION INTRAMUSCULAR; INTRAVENOUS EVERY 4 HOURS PRN
Status: DISCONTINUED | OUTPATIENT
Start: 2021-12-23 | End: 2021-12-26 | Stop reason: HOSPADM

## 2021-12-23 RX ORDER — PROMETHAZINE HYDROCHLORIDE 25 MG/1
12.5-25 TABLET ORAL EVERY 4 HOURS PRN
Status: DISCONTINUED | OUTPATIENT
Start: 2021-12-23 | End: 2021-12-26 | Stop reason: HOSPADM

## 2021-12-23 RX ORDER — AMOXICILLIN 250 MG
2 CAPSULE ORAL 2 TIMES DAILY
Status: DISCONTINUED | OUTPATIENT
Start: 2021-12-23 | End: 2021-12-24

## 2021-12-23 RX ORDER — ONDANSETRON 2 MG/ML
4 INJECTION INTRAMUSCULAR; INTRAVENOUS EVERY 4 HOURS PRN
Status: DISCONTINUED | OUTPATIENT
Start: 2021-12-23 | End: 2021-12-26 | Stop reason: HOSPADM

## 2021-12-23 RX ORDER — POLYETHYLENE GLYCOL 3350 17 G/17G
1 POWDER, FOR SOLUTION ORAL
Status: DISCONTINUED | OUTPATIENT
Start: 2021-12-23 | End: 2021-12-24

## 2021-12-23 RX ORDER — SODIUM CHLORIDE 9 MG/ML
1000 INJECTION, SOLUTION INTRAVENOUS ONCE
Status: COMPLETED | OUTPATIENT
Start: 2021-12-23 | End: 2021-12-23

## 2021-12-23 RX ORDER — GUAIFENESIN/DEXTROMETHORPHAN 100-10MG/5
10 SYRUP ORAL EVERY 6 HOURS PRN
Status: DISCONTINUED | OUTPATIENT
Start: 2021-12-23 | End: 2021-12-26 | Stop reason: HOSPADM

## 2021-12-23 RX ADMIN — CELECOXIB 200 MG: 200 CAPSULE ORAL at 04:37

## 2021-12-23 RX ADMIN — ENOXAPARIN SODIUM 40 MG: 40 INJECTION SUBCUTANEOUS at 04:37

## 2021-12-23 RX ADMIN — ACETAMINOPHEN 1000 MG: 500 TABLET ORAL at 00:43

## 2021-12-23 RX ADMIN — OMEPRAZOLE 20 MG: 20 CAPSULE, DELAYED RELEASE ORAL at 04:37

## 2021-12-23 RX ADMIN — LORATADINE 10 MG: 10 TABLET ORAL at 04:36

## 2021-12-23 RX ADMIN — ACETAMINOPHEN 1000 MG: 500 TABLET ORAL at 04:36

## 2021-12-23 RX ADMIN — SODIUM CHLORIDE 1000 ML: 9 INJECTION, SOLUTION INTRAVENOUS at 19:39

## 2021-12-23 ASSESSMENT — PAIN DESCRIPTION - PAIN TYPE
TYPE: ACUTE PAIN
TYPE: ACUTE PAIN
TYPE: ACUTE PAIN;SURGICAL PAIN
TYPE: ACUTE PAIN

## 2021-12-23 ASSESSMENT — FIBROSIS 4 INDEX: FIB4 SCORE: 0.78

## 2021-12-23 NOTE — DISCHARGE INSTRUCTIONS
Colectomy D/C instructions:    1. DIET: A low fiber diet is recommended.    The following foods are generally allowed on a low-fiber diet:     Enriched white bread or rolls without seeds   White rice, plain white pasta, noodles and macaroni   Crackers   Refined cereals such as Cream of Wheat   Pancakes or waffles made from white refined flour   Most canned or cooked fruits without skins, seeds or membranes   Fruit and vegetable juice with little or no pulp, fruit-flavored drinks and flavored freeman   Canned or well-cooked vegetables without seeds, hulls or skins, such as carrots, potatoes and tomatoes   Tender meat, poultry and fish   Eggs   Tofu   Creamy peanut butter -- up to 2 tablespoons a day   Milk and foods made from milk, such as yogurt, pudding, ice cream, cheeses and sour cream -- up to 2 cups a day, including any used in cooking   Butter, margarine, oils and salad dressings without seeds   Desserts with no whole grains, seeds, nuts, raisins or coconut     You should avoid the following foods:     Whole-wheat or whole-grain breads, cereals and pasta   Brown or wild rice and other whole grains such as oats, kasha, barley, quinoa   Dried fruits and prune juice   Raw fruit, including those with seeds, skin or membranes, such as berries   Raw or undercooked vegetables, including corn   Dried beans, peas and lentils   Seeds and nuts, and foods containing them   Coconut   Popcorn     If you're eating a low-fiber diet, a typical menu might look like this:     Breakfast:   1 glass milk   1 egg   1 slice of white toast with smooth jelly   1/2 cup canned peaches   Snack:   1 cup yogurt   Lunch:   1 to 2 cups of chicken noodle soup   Soda crackers   [place] of drained tuna with mayonnaise or salad dressing on white bread   Canned applesauce   Flavored water or iced tea     Snack:   White toast, bread or crackers   2 tablespoons creamy peanut butter   Flavored water     Dinner:   3 ounces lean meat, poultry or fish    1/2 cup white rice   1/2 cup cooked vegetables, such as carrots or green beans   1 enriched white dinner roll with butter   Hot tea     Prepare all foods so that they're tender. Good cooking methods include simmering, poaching, stewing, steaming and braising. Baking or microwaving in a covered dish is another option. Try to avoid roasting, broiling and grilling -- methods that tend to make foods dry and tough. You may also want to avoid fried foods and go easy on spices.   Keep in mind that you may have fewer bowel movements and smaller stools while you're following a low-fiber diet. To avoid constipation, you may need to drink extra fluids. Drink plenty of water unless your doctor tells you otherwise, and use juices and milk as noted.    2. ACTIVITIES: After discharge from the hospital, you may resume full routine activities as you feel up to them.  You have a 10 pound weight restriction for two weeks after surgery. This means no lifting anything heavier than a gallon of milk. Routine activities such as bathing, walking, going up and down stairs, and driving* (see below) are safe.     3. DRIVING: You may drive whenever you are off pain medications and are able to perform the activities needed to drive, i.e. turning, bending, twisting, etc.    4. BATHING: no restrictions, unless you have a drain in place, in which case, showering is okay, but no baths or pools until after your first postoperative appointment.    5. PAIN MEDICATION: You will be given a prescription for pain medication at discharge. Please take these as directed. It is important to remember not to take medications on an empty stomach as this may cause nausea.    6. BOWEL FUNCTION: A few patients, after this operation, will develop either frequent or loose stools after meals. This usually corrects itself after a few days, to a few months.    7. APPOINTMENT: Contact our office at [phone] for a follow-up appointment in 1-2 weeks following your  procedure.    8. CALL IF YOU HAVE: (1) Fevers to more than 101F, (2) Unusual chest or leg pain, (3) Drainage or fluid from incision that may be foul smelling, increased tenderness or soreness at the wound or the wound edges are no longer together, redness or swelling at the incision site. Please do not hesitate to call with any other questions.       If you have any additional questions, please do not hesitate to call the office and speak to either myself or the physician on call.    Damian Camacho MD PhD  [facility]  Colon and Rectal Surgeon  [phone]      Discharge Instructions    Discharged to home by car with relative. Discharged via walking, hospital escort: Refused.  Special equipment needed: Not Applicable    Be sure to schedule a follow-up appointment with your primary care doctor or any specialists as instructed.     Discharge Plan:   Influenza Vaccine Indication: Not indicated: Previously immunized this influenza season and > 8 years of age    I understand that a diet low in cholesterol, fat, and sodium is recommended for good health. Unless I have been given specific instructions below for another diet, I accept this instruction as my diet prescription.   Other diet: low fiber GI soft     Special Instructions: None    · Is patient discharged on Warfarin / Coumadin?   No     Depression / Suicide Risk    As you are discharged from this [facility] facility, it is important to learn how to keep safe from harming yourself.    Recognize the warning signs:  · Abrupt changes in personality, positive or negative- including increase in energy   · Giving away possessions  · Change in eating patterns- significant weight changes-  positive or negative  · Change in sleeping patterns- unable to sleep or sleeping all the time   · Unwillingness or inability to communicate  · Depression  · Unusual sadness, discouragement and loneliness  · Talk of wanting to die  · Neglect of personal  appearance   · Rebelliousness- reckless behavior  · Withdrawal from people/activities they love  · Confusion- inability to concentrate     If you or a loved one observes any of these behaviors or has concerns about self-harm, here's what you can do:  · Talk about it- your feelings and reasons for harming yourself  · Remove any means that you might use to hurt yourself (examples: pills, rope, extension cords, firearm)  · Get professional help from the community (Mental Health, Substance Abuse, psychological counseling)  · Do not be alone:Call your Safe Contact- someone whom you trust who will be there for you.  · Call your local CRISIS HOTLINE 201-2925 or 742-509-4690  · Call your local Children's Mobile Crisis Response Team [place] (274) 358-3936 or www.[employer]  · Call the toll free National Suicide Prevention Hotlines   · National Suicide Prevention Lifeline 806-147-LFIE (9143)  · National Hope Line Network 800-SUICIDE (402-5745)

## 2021-12-23 NOTE — PROGRESS NOTES
Bedside report received from AM RN; assumed care. A&O x 4. VSS. 96% on RA. Patient denying SOB, numbness, tingling, nausea, vomiting, dizziness, pain. Generalized weakness reported d/t poor appetite for months.  Assessment complete. Abdomen round, tender. Normoactive BS x 4 noted. Prior incisional scars noted. RLQ transverse incision, covered with dry gauze/hypafix tape. Old serosang drainage noted on bandage. + void; yellow urine reported. + eructation. - flatus. LBM PTA. Patient tolerating GI soft diet. Patient up self with steady gait noted. Discussed plan of care/possible discharge with patient. All questions answered.  Low/no fall risk. Bed in locked/lowest position.  Call light/personal belongings within reach.  All needs met, patient resting at present time.

## 2021-12-23 NOTE — CARE PLAN
The patient is Stable - Low risk of patient condition declining or worsening    Shift Goals  Clinical Goals: Ambulation, + flatus, rest  Patient Goals: BM, discharge    Progress made toward(s) clinical / shift goals:  d/c today    Patient is not progressing towards the following goals:

## 2021-12-23 NOTE — CARE PLAN
The patient is Stable - Low risk of patient condition declining or worsening    Shift Goals  Clinical Goals: Ambulation, + flatus, rest  Patient Goals: BM, discharge    Progress made toward(s) clinical / shift goals:  Patient instructed to ambulate a lap with each trip to restroom; performed. Awaiting flatus. Patient resting at present time.     Patient is not progressing towards the following goals: NA.

## 2021-12-23 NOTE — PROGRESS NOTES
"S:  61 y.o.male s/p ileostomy reversal POD# 1.  Patient has not yet ambulated.  Tolerated diet.  Denies nausea or vomiting.  Denies stool or flatus.    O:  /69   Pulse 73   Temp 36.6 °C (97.9 °F) (Temporal)   Resp 18   Ht 1.753 m (5' 9\")   Wt 94.5 kg (208 lb 5.4 oz)   SpO2 95%   I/O last 3 completed shifts:  In: 1675 [P.O.:775; I.V.:900]  Out: 1400 [Urine:675; Emesis:675]  Recent Labs     12/22/21  0353   SODIUM 134*   POTASSIUM 4.1   CHLORIDE 99   CO2 24   GLUCOSE 139*   BUN 12   CREATININE 0.84   CALCIUM 9.2     Recent Labs     12/22/21  0353   WBC 15.3*   RBC 5.18   HEMOGLOBIN 14.7   HEMATOCRIT 43.4   MCV 83.8   MCH 28.4   MCHC 33.9   RDW 42.7   PLATELETCT 283   MPV 9.5       Alert and Oriented x3, No Acute Distress  Normal Respiratory Effort  Abdomen soft, appropriately tender  Incisions/Bandages clean/dry/intact  Extremities warm and well perfused      A/P:  Patient encouraged to take meals in the chair rather then the bed.  Patient educated on appropriate judicious use of narcotics.  Hep-Lock IV.  Encourage by mouth intake.  Lozada catheter removed and patient voiding appropriately.  Advance to low residue diet.  Patient encouraged to ambulate ad gabriela.  Damian Camacho M.D. PhD  [facility]  Colon and Rectal Surgery  (Office) [phone]  (Cell) [phone]    "

## 2021-12-24 PROBLEM — D72.829 LEUKOCYTOSIS: Status: ACTIVE | Noted: 2021-12-24

## 2021-12-24 PROBLEM — D62 ACUTE BLOOD LOSS ANEMIA: Status: ACTIVE | Noted: 2021-12-24

## 2021-12-24 PROBLEM — E87.29 INCREASED ANION GAP METABOLIC ACIDOSIS: Status: ACTIVE | Noted: 2021-12-24

## 2021-12-24 PROBLEM — R73.9 HYPERGLYCEMIA: Status: ACTIVE | Noted: 2021-12-24

## 2021-12-24 PROBLEM — K92.2 ACUTE LOWER GI BLEEDING: Status: ACTIVE | Noted: 2021-12-24

## 2021-12-24 PROBLEM — E87.20 METABOLIC ACIDOSIS: Status: ACTIVE | Noted: 2021-12-24

## 2021-12-24 PROBLEM — D72.825 BANDEMIA: Status: ACTIVE | Noted: 2021-12-24

## 2021-12-24 PROBLEM — K92.1 BLOODY STOOL: Status: ACTIVE | Noted: 2021-12-24

## 2021-12-24 LAB
ALBUMIN SERPL BCP-MCNC: 3.5 G/DL (ref 3.2–4.9)
BASOPHILS # BLD AUTO: 0.2 % (ref 0–1.8)
BASOPHILS # BLD: 0.02 K/UL (ref 0–0.12)
BUN SERPL-MCNC: 14 MG/DL (ref 8–22)
CALCIUM SERPL-MCNC: 8.4 MG/DL (ref 8.5–10.5)
CHLORIDE SERPL-SCNC: 106 MMOL/L (ref 96–112)
CO2 SERPL-SCNC: 21 MMOL/L (ref 20–33)
CREAT SERPL-MCNC: 0.65 MG/DL (ref 0.5–1.4)
EOSINOPHIL # BLD AUTO: 0.02 K/UL (ref 0–0.51)
EOSINOPHIL NFR BLD: 0.2 % (ref 0–6.9)
ERYTHROCYTE [DISTWIDTH] IN BLOOD BY AUTOMATED COUNT: 42.5 FL (ref 35.9–50)
FERRITIN SERPL-MCNC: 357 NG/ML (ref 22–322)
FOLATE SERPL-MCNC: 9.8 NG/ML
GLUCOSE SERPL-MCNC: 128 MG/DL (ref 65–99)
HCT VFR BLD AUTO: 32.6 % (ref 42–52)
HCT VFR BLD AUTO: 33.3 % (ref 42–52)
HCT VFR BLD AUTO: 34.3 % (ref 42–52)
HGB BLD-MCNC: 11.2 G/DL (ref 14–18)
HGB BLD-MCNC: 11.2 G/DL (ref 14–18)
HGB BLD-MCNC: 11.5 G/DL (ref 14–18)
IMM GRANULOCYTES # BLD AUTO: 0.08 K/UL (ref 0–0.11)
IMM GRANULOCYTES NFR BLD AUTO: 0.7 % (ref 0–0.9)
IRON SATN MFR SERPL: 14 % (ref 15–55)
IRON SERPL-MCNC: 41 UG/DL (ref 50–180)
LDH SERPL L TO P-CCNC: 179 U/L (ref 107–266)
LYMPHOCYTES # BLD AUTO: 1.92 K/UL (ref 1–4.8)
LYMPHOCYTES NFR BLD: 18 % (ref 22–41)
MAGNESIUM SERPL-MCNC: 2.1 MG/DL (ref 1.5–2.5)
MCH RBC QN AUTO: 28.8 PG (ref 27–33)
MCHC RBC AUTO-ENTMCNC: 34.5 G/DL (ref 33.7–35.3)
MCV RBC AUTO: 83.3 FL (ref 81.4–97.8)
MONOCYTES # BLD AUTO: 1.14 K/UL (ref 0–0.85)
MONOCYTES NFR BLD AUTO: 10.7 % (ref 0–13.4)
NEUTROPHILS # BLD AUTO: 7.49 K/UL (ref 1.82–7.42)
NEUTROPHILS NFR BLD: 70.2 % (ref 44–72)
NRBC # BLD AUTO: 0 K/UL
NRBC BLD-RTO: 0 /100 WBC
PHOSPHATE SERPL-MCNC: 2.7 MG/DL (ref 2.5–4.5)
PLATELET # BLD AUTO: 265 K/UL (ref 164–446)
PMV BLD AUTO: 9.7 FL (ref 9–12.9)
POTASSIUM SERPL-SCNC: 4.1 MMOL/L (ref 3.6–5.5)
RBC # BLD AUTO: 4 M/UL (ref 4.7–6.1)
SODIUM SERPL-SCNC: 137 MMOL/L (ref 135–145)
TIBC SERPL-MCNC: 302 UG/DL (ref 250–450)
TRANSFERRIN SERPL-MCNC: 257 MG/DL (ref 200–370)
UIBC SERPL-MCNC: 261 UG/DL (ref 110–370)
VIT B12 SERPL-MCNC: 504 PG/ML (ref 211–911)
WBC # BLD AUTO: 10.7 K/UL (ref 4.8–10.8)

## 2021-12-24 PROCEDURE — A9270 NON-COVERED ITEM OR SERVICE: HCPCS | Performed by: STUDENT IN AN ORGANIZED HEALTH CARE EDUCATION/TRAINING PROGRAM

## 2021-12-24 PROCEDURE — 84466 ASSAY OF TRANSFERRIN: CPT

## 2021-12-24 PROCEDURE — 82607 VITAMIN B-12: CPT

## 2021-12-24 PROCEDURE — C9113 INJ PANTOPRAZOLE SODIUM, VIA: HCPCS | Performed by: STUDENT IN AN ORGANIZED HEALTH CARE EDUCATION/TRAINING PROGRAM

## 2021-12-24 PROCEDURE — 700102 HCHG RX REV CODE 250 W/ 637 OVERRIDE(OP): Performed by: STUDENT IN AN ORGANIZED HEALTH CARE EDUCATION/TRAINING PROGRAM

## 2021-12-24 PROCEDURE — 80069 RENAL FUNCTION PANEL: CPT

## 2021-12-24 PROCEDURE — 85014 HEMATOCRIT: CPT

## 2021-12-24 PROCEDURE — 83540 ASSAY OF IRON: CPT

## 2021-12-24 PROCEDURE — 83735 ASSAY OF MAGNESIUM: CPT

## 2021-12-24 PROCEDURE — 83550 IRON BINDING TEST: CPT

## 2021-12-24 PROCEDURE — 82746 ASSAY OF FOLIC ACID SERUM: CPT

## 2021-12-24 PROCEDURE — 83615 LACTATE (LD) (LDH) ENZYME: CPT

## 2021-12-24 PROCEDURE — 83010 ASSAY OF HAPTOGLOBIN QUANT: CPT

## 2021-12-24 PROCEDURE — 82728 ASSAY OF FERRITIN: CPT

## 2021-12-24 PROCEDURE — 85025 COMPLETE CBC W/AUTO DIFF WBC: CPT

## 2021-12-24 PROCEDURE — 99232 SBSQ HOSP IP/OBS MODERATE 35: CPT | Performed by: STUDENT IN AN ORGANIZED HEALTH CARE EDUCATION/TRAINING PROGRAM

## 2021-12-24 PROCEDURE — 85018 HEMOGLOBIN: CPT

## 2021-12-24 PROCEDURE — 36415 COLL VENOUS BLD VENIPUNCTURE: CPT

## 2021-12-24 PROCEDURE — 770004 HCHG ROOM/CARE - ONCOLOGY PRIVATE *

## 2021-12-24 PROCEDURE — 700105 HCHG RX REV CODE 258: Performed by: STUDENT IN AN ORGANIZED HEALTH CARE EDUCATION/TRAINING PROGRAM

## 2021-12-24 PROCEDURE — 700111 HCHG RX REV CODE 636 W/ 250 OVERRIDE (IP): Performed by: STUDENT IN AN ORGANIZED HEALTH CARE EDUCATION/TRAINING PROGRAM

## 2021-12-24 RX ORDER — OMEPRAZOLE 20 MG/1
20 CAPSULE, DELAYED RELEASE ORAL 2 TIMES DAILY
Status: DISCONTINUED | OUTPATIENT
Start: 2021-12-24 | End: 2021-12-26 | Stop reason: HOSPADM

## 2021-12-24 RX ORDER — POLYETHYLENE GLYCOL 3350 17 G/17G
1 POWDER, FOR SOLUTION ORAL
Status: DISCONTINUED | OUTPATIENT
Start: 2021-12-24 | End: 2021-12-26 | Stop reason: HOSPADM

## 2021-12-24 RX ORDER — BISACODYL 10 MG
10 SUPPOSITORY, RECTAL RECTAL
Status: DISCONTINUED | OUTPATIENT
Start: 2021-12-24 | End: 2021-12-26 | Stop reason: HOSPADM

## 2021-12-24 RX ORDER — AMOXICILLIN 250 MG
2 CAPSULE ORAL 2 TIMES DAILY PRN
Status: DISCONTINUED | OUTPATIENT
Start: 2021-12-24 | End: 2021-12-26 | Stop reason: HOSPADM

## 2021-12-24 RX ORDER — LORATADINE 10 MG/1
10 TABLET ORAL DAILY
Status: DISCONTINUED | OUTPATIENT
Start: 2021-12-24 | End: 2021-12-26 | Stop reason: HOSPADM

## 2021-12-24 RX ORDER — SODIUM CHLORIDE, SODIUM LACTATE, POTASSIUM CHLORIDE, CALCIUM CHLORIDE 600; 310; 30; 20 MG/100ML; MG/100ML; MG/100ML; MG/100ML
INJECTION, SOLUTION INTRAVENOUS CONTINUOUS
Status: DISCONTINUED | OUTPATIENT
Start: 2021-12-24 | End: 2021-12-24

## 2021-12-24 RX ORDER — VITAMIN B COMPLEX
2000 TABLET ORAL DAILY
Status: DISCONTINUED | OUTPATIENT
Start: 2021-12-24 | End: 2021-12-26 | Stop reason: HOSPADM

## 2021-12-24 RX ORDER — PANTOPRAZOLE SODIUM 40 MG/10ML
40 INJECTION, POWDER, LYOPHILIZED, FOR SOLUTION INTRAVENOUS 2 TIMES DAILY
Status: DISCONTINUED | OUTPATIENT
Start: 2021-12-24 | End: 2021-12-24

## 2021-12-24 RX ADMIN — LORATADINE 10 MG: 10 TABLET ORAL at 06:19

## 2021-12-24 RX ADMIN — SODIUM CHLORIDE, POTASSIUM CHLORIDE, SODIUM LACTATE AND CALCIUM CHLORIDE: 600; 310; 30; 20 INJECTION, SOLUTION INTRAVENOUS at 02:31

## 2021-12-24 RX ADMIN — PANTOPRAZOLE SODIUM 40 MG: 40 INJECTION, POWDER, FOR SOLUTION INTRAVENOUS at 06:19

## 2021-12-24 RX ADMIN — OMEPRAZOLE 20 MG: 20 CAPSULE, DELAYED RELEASE ORAL at 19:46

## 2021-12-24 ASSESSMENT — ENCOUNTER SYMPTOMS
SHORTNESS OF BREATH: 0
EYE PAIN: 0
CHILLS: 0
COUGH: 0
WHEEZING: 0
SINUS PAIN: 0
DIZZINESS: 0
ABDOMINAL PAIN: 0
FALLS: 0
WEAKNESS: 0
DOUBLE VISION: 0
HEARTBURN: 0
BRUISES/BLEEDS EASILY: 0
SPEECH CHANGE: 0
BLOOD IN STOOL: 0
PALPITATIONS: 0
FOCAL WEAKNESS: 0
MYALGIAS: 0
NERVOUS/ANXIOUS: 1
DEPRESSION: 0
SORE THROAT: 0
CLAUDICATION: 0
ORTHOPNEA: 0
BLURRED VISION: 0
FLANK PAIN: 0
HEMOPTYSIS: 0
HEADACHES: 0
CONSTIPATION: 0
NECK PAIN: 0
NAUSEA: 0
BACK PAIN: 0
VOMITING: 0
FEVER: 0
ABDOMINAL PAIN: 1
BLOOD IN STOOL: 1
DIARRHEA: 0

## 2021-12-24 ASSESSMENT — PAIN DESCRIPTION - PAIN TYPE: TYPE: ACUTE PAIN

## 2021-12-24 NOTE — CARE PLAN
Problem: Knowledge Deficit - Standard  Goal: Patient and family/care givers will demonstrate understanding of plan of care, disease process/condition, diagnostic tests and medications  12/24/2021 1200 by Sujata Collins, R.N.  Outcome: Progressing  12/24/2021 1200 by Sujata Collins RSusyN.  Outcome: Progressing   The patient is stable    Shift Goals  Clinical Goals: monitor labs  Patient Goals: Reduce amount of blood in stool  Family Goals: N/A    Progress made toward(s) clinical / shift goals: none    Patient is not progressing towards the following goals:patient is having bloody stools, MD aware

## 2021-12-24 NOTE — ED PROVIDER NOTES
ED Provider Note    CHIEF COMPLAINT  Chief Complaint   Patient presents with   • Post-Op Complications     discharged from [facility] this morning after ileostomy reversal due to colon cancer.  Pt reports no appetite and fatigue along with rectal bleeding   • Bloody Stools       HPI  Surinder Serrano is a 61 y.o. male who presents for evaluation of blood in stool. Patient was apparently discharged this morning after an ileostomy takedown on the 21st. Patient reports no appetite, fatigue, and multiple episodes of dark watery/bloody diarrhea. He notes no particular abdominal pain except in the periincisional region. He notes sweating, nausea, and malaise    REVIEW OF SYSTEMS  Constitutional: Sweating, malaise, chills.  No measured fevers.  Loss of appetite noted  Skin: No rashes.  Cold clammy skin  HEENT: No sore throat, runny nose, sores, trouble swallowing, trouble speaking.  Neck: No neck pain, stiffness, or masses.  Chest: No pain or rashes  Pulm: No shortness of breath, cough, wheezing, stridor, or pain with inspiration/expiration  Gastrointestinal: No vomiting, diarrhea, constipation, bloating, or significant abdominal pain.  Genitourinary: No dysuria or hematuria  Musculoskeletal: No recent trauma, pain, swelling, weakness  Neurologic: No sensory or motor changes. No confusion or disorientation.  Heme: No bleeding or bruising problems.   Immuno: No hx of recurrent infections    PAST FAM HISTORY  History reviewed. No pertinent family history.    PAST MEDICAL HISTORY   has a past medical history of Cancer (HCC) (2021), Heart burn, and High cholesterol.    SOCIAL HISTORY  Social History     Tobacco Use   • Smoking status: Never Smoker   • Smokeless tobacco: Never Used   Vaping Use   • Vaping Use: Never used   Substance and Sexual Activity   • Alcohol use: Yes     Alcohol/week: 3.6 oz     Types: 3 Glasses of wine, 3 Cans of beer per week     Comment: 1-2 a day   • Drug use: Never   • Sexual activity: Not on file  "      SURGICAL HISTORY   has a past surgical history that includes tonsillectomy (1978); sigmoidoscopy,diagnostic (9/21/2021); low anterior resection robotic xi (9/21/2021); ileostomy (9/21/2021); and creation or revision, ileostomy (12/21/2021).    CURRENT MEDICATIONS  Home Medications     Reviewed by Citlali Simpson R.N. (Registered Nurse) on 12/23/21 at 1752  Med List Status: Partial   Medication Last Dose Status   Cholecalciferol (VITAMIN D) 50 MCG (2000 UT) Cap  Active   loratadine (CLARITIN) 10 MG Tab  Active   metroNIDAZOLE (FLAGYL) 500 MG Tab  Active   neomycin (MYCIFRADIN) 500 MG Tab  Active   omeprazole (PRILOSEC) 20 MG delayed-release capsule  Active                ALLERGIES  No Known Allergies    PHYSICAL EXAM  VITAL SIGNS: /83   Pulse 98   Temp 36.5 °C (97.7 °F) (Temporal)   Resp 16   Ht 1.753 m (5' 9\")   Wt 94.7 kg (208 lb 12.4 oz)   SpO2 94%   BMI 30.83 kg/m²    Gen: Appears tired, slightly pale.  HEENT: No signs of trauma, Bilateral external ears normal, Nose normal. Conjunctiva normal, Non-icteric.   Neck:  No tenderness, Supple, No masses  Lymphatic: No cervical lymphadenopathy noted.   Cardiovascular: Tachycardia with regular rhythm, no murmurs.  Capillary refill less than 3 seconds to all extremities, 2+ distal pulses.  Thorax & Lungs: Normal breath sounds, No respiratory distress, No wheezing bilateral chest rise  Abdomen: Bowel sounds normal, Soft,.  Seasonal tenderness noted, mild, incision is clean dry and intact no masses, No pulsatile masses. No Guarding or rebound  Skin: Cool, clammy.  No erythema to periincisional region.  Extremities: Intact distal pulses, No edema  Neurologic: Alert , no facial droop, grossly normal coordination and strength  Psychiatric: Affect pleasant      LABS  Results for orders placed or performed during the hospital encounter of 12/23/21   COD (ADULT)   Result Value Ref Range    ABO Grouping Only A     Rh Grouping Only POS     Antibody Screen-Cod " NEG    CBC WITH DIFFERENTIAL   Result Value Ref Range    WBC 16.8 (H) 4.8 - 10.8 K/uL    RBC 5.16 4.70 - 6.10 M/uL    Hemoglobin 14.4 14.0 - 18.0 g/dL    Hematocrit 43.1 42.0 - 52.0 %    MCV 83.5 81.4 - 97.8 fL    MCH 27.9 27.0 - 33.0 pg    MCHC 33.4 (L) 33.7 - 35.3 g/dL    RDW 43.1 35.9 - 50.0 fL    Platelet Count 392 164 - 446 K/uL    MPV 9.6 9.0 - 12.9 fL    Neutrophils-Polys 74.40 (H) 44.00 - 72.00 %    Lymphocytes 13.40 (L) 22.00 - 41.00 %    Monocytes 10.90 0.00 - 13.40 %    Eosinophils 0.10 0.00 - 6.90 %    Basophils 0.40 0.00 - 1.80 %    Immature Granulocytes 0.80 0.00 - 0.90 %    Nucleated RBC 0.00 /100 WBC    Neutrophils (Absolute) 12.52 (H) 1.82 - 7.42 K/uL    Lymphs (Absolute) 2.26 1.00 - 4.80 K/uL    Monos (Absolute) 1.83 (H) 0.00 - 0.85 K/uL    Eos (Absolute) 0.01 0.00 - 0.51 K/uL    Baso (Absolute) 0.07 0.00 - 0.12 K/uL    Immature Granulocytes (abs) 0.13 (H) 0.00 - 0.11 K/uL    NRBC (Absolute) 0.00 K/uL   COMP METABOLIC PANEL   Result Value Ref Range    Sodium 137 135 - 145 mmol/L    Potassium 3.8 3.6 - 5.5 mmol/L    Chloride 103 96 - 112 mmol/L    Co2 17 (L) 20 - 33 mmol/L    Anion Gap 17.0 (H) 7.0 - 16.0    Glucose 174 (H) 65 - 99 mg/dL    Bun 14 8 - 22 mg/dL    Creatinine 0.89 0.50 - 1.40 mg/dL    Calcium 9.3 8.5 - 10.5 mg/dL    AST(SGOT) 22 12 - 45 U/L    ALT(SGPT) 45 2 - 50 U/L    Alkaline Phosphatase 72 30 - 99 U/L    Total Bilirubin 0.9 0.1 - 1.5 mg/dL    Albumin 4.2 3.2 - 4.9 g/dL    Total Protein 7.4 6.0 - 8.2 g/dL    Globulin 3.2 1.9 - 3.5 g/dL    A-G Ratio 1.3 g/dL   LIPASE   Result Value Ref Range    Lipase 39 11 - 82 U/L   PROTHROMBIN TIME   Result Value Ref Range    PT 14.4 12.0 - 14.6 sec    INR 1.15 (H) 0.87 - 1.13   APTT   Result Value Ref Range    APTT 28.7 24.7 - 36.0 sec   ABO Rh Confirm   Result Value Ref Range    ABO Rh Confirm A POS    ESTIMATED GFR   Result Value Ref Range    GFR If African American >60 >60 mL/min/1.73 m 2    GFR If Non African American >60 >60 mL/min/1.73 m 2        RADIOLOGY  DX-CHEST-PORTABLE (1 VIEW)   Final Result      Mild left basilar atelectasis.          HYDRATION: Based on the patient's presentation of GI Bleed / Upset the patient was given IV fluids. IV Hydration was used because oral hydration was not adequate alone. Upon recheck following hydration, the patient was stable.    COURSE & MEDICAL DECISION MAKING  Patient arrives for what appears to be multiple episodes of maroon or bloody stool since his discharge this morning.  Patient symptoms continue to worsen at home today and included feeling cold, clammy, and sweaty.  He has had no appetite but has been able to keep fluids down.  He has no significant pain or tenderness on my evaluation so I do not suspect an anastomosis leak however he could have some residual intestinal bleeding from the anastomosis.  He does not appear hemodynamically unstable but is tachycardic persistently.  I will empirically hydrate him and get in contact with his primary surgeon once the labs have returned.  9:04 PM  Discussed case with Dr. Felton, on-call surgeon for Dr. Camacho, she agreed with the plan for admission for observation and surgical evaluation in the morning.  Patient has not experienced any deterioration and, after discussion with the hospitalist, he will be admitted for observation to the hospitalist service until he can be seen in the morning.  Patient states understanding of this.  He experienced no deterioration in his hemodynamics or neurologic status while in the emergency department.    FINAL IMPRESSION  1.  Lower GI bleed    Electronically signed by: Onel Choudhury M.D., 12/23/2021 7:28 PM

## 2021-12-24 NOTE — PROGRESS NOTES
"S:  61 y.o.male s/p ileostomy closure  POD# 3.  Patient had multiple bloody bowel movements overnight.    O:  /76   Pulse 87   Temp 36.6 °C (97.8 °F) (Temporal)   Resp 18   Ht 1.753 m (5' 9\")   Wt 94.7 kg (208 lb 12.4 oz)   SpO2 95%   No intake/output data recorded.  Recent Labs     12/23/21  0501 12/23/21 1855 12/24/21  0036   SODIUM 136 137 137   POTASSIUM 3.9 3.8 4.1   CHLORIDE 100 103 106   CO2 22 17* 21   GLUCOSE 117* 174* 128*   BUN 15 14 14   CREATININE 0.79 0.89 0.65   CALCIUM 9.0 9.3 8.4*     Recent Labs     12/23/21  0501 12/23/21  0501 12/23/21 1855 12/24/21  0036 12/24/21  0949   WBC 12.1*  --  16.8* 10.7  --    RBC 4.99  --  5.16 4.00*  --    HEMOGLOBIN 14.1   < > 14.4 11.5* 11.2*   HEMATOCRIT 41.4*   < > 43.1 33.3* 32.6*   MCV 83.0  --  83.5 83.3  --    MCH 28.3  --  27.9 28.8  --    MCHC 34.1  --  33.4* 34.5  --    RDW 42.5  --  43.1 42.5  --    PLATELETCT 266  --  392 265  --    MPV 9.7  --  9.6 9.7  --     < > = values in this interval not displayed.       Alert and Oriented x3, No Acute Distress  Normal Respiratory Effort  Abdomen soft, appropriately tender  Incisions/Bandages clean/dry/intact  Extremities warm and well perfused      A/P:bleeding per rectum after ileostomy reversal  Patient not amenable to endoscopic intervention due to location of the anastomosis  Hold DVT prophylaxis and encourage ambulation  Nothing by mouth except for sips with medications  Continue serial hemoglobins  Hold transfusion for hemodynamic instability or hemoglobin less than 8  Stat H&H if patient demonstrates tachycardia, syncope or hypotension  Damian Camacho M.D. PhD  [facility]  Colon and Rectal Surgery  (Office) [phone]  (cell) [phone]  "

## 2021-12-24 NOTE — ED TRIAGE NOTES
"Chief Complaint   Patient presents with   • Post-Op Complications     discharged from [facility] this morning after iliostomy reversal. Pt reports no appetite and fatigue along with rectal bleeding         Pt ambulated to triage for above complaint. Surgery was 12/21/21. Pt reports increase in fatigue, loss of appetite  and shaking after being discharged.   Pt denies N/V and weakness.   Pt is AO x 4, follows commands, and responds appropriately to questions. Patient's breathing is unlabored and pain is currently 0/10 on the 0-10 pain scale.  Pt placed in lobby. Patient educated on triage process and encouraged to alert staff for any changes.    /102   Pulse (!) 130   Temp 36.7 °C (98.1 °F) (Temporal)   Resp 16   Ht 1.753 m (5' 9\")   Wt 94.7 kg (208 lb 12.4 oz)   SpO2 98%     "

## 2021-12-24 NOTE — ASSESSMENT & PLAN NOTE
Surgically resected by LAR with loop ileostomy 9/21/21 with Dr. Camacho  Pathology without residual carcinoma 0/11 nodes  Ileostomy reversed 12/21/21 with Dr. Camacho  Follow up with Dr. Camacho for post-operative care

## 2021-12-24 NOTE — DISCHARGE PLANNING
HTH/SCP TCN chart review completed. Collaborated with Jacqueline. The most current review of medical record, knowledge of pt's PLOF and social support, LACE+ score of 63, 6 clicks scores of (none entered) mobility were considered.  Pt seen at bedside. Introduced TCN program to pt and provided education egarding differences in post acute resources including IRF, SNF and HH . TCN introduced GSC services- denies need for GSC services at this time. Pt verbalizes understanding. Member denies any needs for Transportation/food/housing assistance at this time. There are no additional TCN needs at this time. TCN will continue to follow and collaborate with discharge planning team as additional post acute needs arise. Thank you.     Choice obtained this visit: NONE

## 2021-12-24 NOTE — PROGRESS NOTES
4 Eyes Skin Assessment Completed by HIRO Keith and HIRO Schaefer.    Head WDL  Ears WDL  Nose WDL  Mouth WDL  Neck WDL  Breast/Chest WDL  Shoulder Blades WDL  Spine WDL  (R) Arm/Elbow/Hand WDL  (L) Arm/Elbow/Hand WDL  Abdomen WDL  Groin WDL  Scrotum/Coccyx/Buttocks WDL  (R) Leg WDL  (L) Leg WDL  (R) Heel/Foot/Toe WDL  (L) Heel/Foot/Toe WDL          Devices In Places N/A      Interventions In Place Pillows and Q2 Turns    Possible Skin Injury No    Pictures Uploaded Into Epic N/A  Wound Consult Placed N/A  RN Wound Prevention Protocol Ordered No

## 2021-12-24 NOTE — CARE PLAN
Problem: Knowledge Deficit - Standard  Goal: Patient and family/care givers will demonstrate understanding of plan of care, disease process/condition, diagnostic tests and medications  Outcome: Progressing   The patient is Watcher - Medium risk of patient condition declining or worsening    Shift Goals  Clinical Goals: monitor labs and output of blood in stool  Patient Goals: Reduce amount of blood in stool  Family Goals: N/A

## 2021-12-24 NOTE — H&P
Hospital Medicine History & Physical Note    Date of Service  12/23/2021    Primary Care Physician  Nesha Matias D.O.    Consultants  Surgery    Code Status  Full Code    Chief Complaint  Chief Complaint   Patient presents with   • Post-Op Complications     discharged from [facility] this morning after ileostomy reversal due to colon cancer.  Pt reports no appetite and fatigue along with rectal bleeding   • Bloody Stools       History of Presenting Illness  Surinder Serrano is a 61 y.o. male with h/o colon CA s/p surgery and colostomy placement, subsequent ostomy takedown 12/21/2021 discharged 12/23/2021 who returned to ER the same evening with c/o large volume bloody stool. Discharge AM Hgb 14.1 -- ER Hgb 14.4. Surgery has been consulted by ERP, requesting for admission and formal surgical evaluation to follow in AM.    I discussed the plan of care with patient and bedside RN.    Review of Systems  Review of Systems   Constitutional: Negative for chills, fever and malaise/fatigue.   HENT: Negative for hearing loss and tinnitus.    Eyes: Negative for blurred vision and double vision.   Respiratory: Negative for cough and wheezing.    Cardiovascular: Negative for chest pain and palpitations.   Gastrointestinal: Positive for blood in stool. Negative for abdominal pain, constipation, heartburn, nausea and vomiting.   Genitourinary: Negative for dysuria and flank pain.   Musculoskeletal: Negative for falls and myalgias.   Skin: Negative for itching and rash.   Neurological: Negative for dizziness, speech change, focal weakness and headaches.   Endo/Heme/Allergies: Negative for environmental allergies. Does not bruise/bleed easily.   Psychiatric/Behavioral: Negative for depression and suicidal ideas.   All other systems reviewed and are negative.      Past Medical History   has a past medical history of Cancer (HCC) (2021), Heart burn, and High cholesterol.    Surgical History   has a past surgical history that  includes tonsillectomy (1978); pr sigmoidoscopy,diagnostic (9/21/2021); low anterior resection robotic xi (9/21/2021); ileostomy (9/21/2021); and creation or revision, ileostomy (12/21/2021).     Family History  family history is not on file.   Family history reviewed with patient. There is no family history that is pertinent to the chief complaint.     Social History   reports that he has never smoked. He has never used smokeless tobacco. He reports current alcohol use of about 3.6 oz of alcohol per week. He reports that he does not use drugs.    Allergies  No Known Allergies    Medications  Prior to Admission Medications   Prescriptions Last Dose Informant Patient Reported? Taking?   Cholecalciferol (VITAMIN D) 50 MCG (2000 UT) Cap  Patient Yes No   Sig: Take 2,000 Units by mouth every day.   loratadine (CLARITIN) 10 MG Tab  Patient Yes No   Sig: Take 10 mg by mouth every day.   metroNIDAZOLE (FLAGYL) 500 MG Tab  Patient Yes No   Sig: Take 500 mg by mouth one time before surgery.   neomycin (MYCIFRADIN) 500 MG Tab  Patient Yes No   Sig: Take 1,000 mg by mouth one time before surgery.   omeprazole (PRILOSEC) 20 MG delayed-release capsule  Patient Yes No   Sig: Take 20 mg by mouth every day.      Facility-Administered Medications: None       Physical Exam  Temp:  [36.5 °C (97.7 °F)-36.9 °C (98.5 °F)] 36.6 °C (97.9 °F)  Pulse:  [] 99  Resp:  [16-18] 16  BP: (112-150)/() 112/82  SpO2:  [93 %-98 %] 95 %  Blood Pressure: 127/80   Temperature: 36.7 °C (98.1 °F)   Pulse: (!) 106   Respiration: 16   Pulse Oximetry: 93 %       Physical Exam  Vitals and nursing note reviewed.   Constitutional:       Appearance: He is obese.   HENT:      Head: Normocephalic and atraumatic.      Nose: Nose normal.      Mouth/Throat:      Mouth: Mucous membranes are moist.      Pharynx: Oropharynx is clear.   Eyes:      General: No scleral icterus.     Extraocular Movements: Extraocular movements intact.   Cardiovascular:      Rate  and Rhythm: Normal rate and regular rhythm.      Pulses: Normal pulses.      Heart sounds: No friction rub.   Pulmonary:      Effort: Pulmonary effort is normal. No respiratory distress.      Breath sounds: No wheezing.   Abdominal:      General: There is no distension.      Palpations: Abdomen is soft.      Tenderness: There is no abdominal tenderness. There is no guarding or rebound.      Comments: Notable surgical incision in RLQ, no oozing of blood   Musculoskeletal:         General: No swelling or tenderness. Normal range of motion.      Cervical back: Neck supple. No tenderness.   Skin:     General: Skin is warm and dry.      Capillary Refill: Capillary refill takes less than 2 seconds.   Neurological:      General: No focal deficit present.      Mental Status: He is alert and oriented to person, place, and time.      Motor: No weakness.   Psychiatric:         Mood and Affect: Mood normal.         Laboratory:  Recent Labs     12/23/21  0501 12/23/21  1855 12/24/21  0036   WBC 12.1* 16.8* 10.7   RBC 4.99 5.16 4.00*   HEMOGLOBIN 14.1 14.4 11.5*   HEMATOCRIT 41.4* 43.1 33.3*   MCV 83.0 83.5 83.3   MCH 28.3 27.9 28.8   MCHC 34.1 33.4* 34.5   RDW 42.5 43.1 42.5   PLATELETCT 266 392 265   MPV 9.7 9.6 9.7     Recent Labs     12/22/21  0353 12/23/21  0501 12/23/21  1855   SODIUM 134* 136 137   POTASSIUM 4.1 3.9 3.8   CHLORIDE 99 100 103   CO2 24 22 17*   GLUCOSE 139* 117* 174*   BUN 12 15 14   CREATININE 0.84 0.79 0.89   CALCIUM 9.2 9.0 9.3     Recent Labs     12/22/21  0353 12/23/21  0501 12/23/21  1855   ALTSGPT  --   --  45   ASTSGOT  --   --  22   ALKPHOSPHAT  --   --  72   TBILIRUBIN  --   --  0.9   LIPASE  --   --  39   GLUCOSE 139* 117* 174*     Recent Labs     12/23/21  1855   APTT 28.7   INR 1.15*     No results for input(s): NTPROBNP in the last 72 hours.      No results for input(s): TROPONINT in the last 72 hours.    Imaging:  DX-CHEST-PORTABLE (1 VIEW)   Final Result      Mild left basilar atelectasis.             Assessment/Plan:  I anticipate this patient will require at least two midnights for appropriate medical management, necessitating inpatient admission.    * Bloody stool  Assessment & Plan  Large volume bloody stool postop ostomy take down 12/21  Hgb 14.1>>11.5  PPI  Surgery has been consulted, formal evaluation to follow    Acute blood loss anemia  Assessment & Plan  Monitor HH  Transfuse for Hgb <7 or hemodynamic instability    Rectosigmoid cancer (HCC)- (present on admission)  Assessment & Plan  S/p surgery    Metabolic acidosis  Assessment & Plan  Bicarb 17  IVF    Leukocytosis  Assessment & Plan  Likely reactive    Post-operative hematocrit drop- (present on admission)  Assessment & Plan  Monitor HH    Hyperglycemia  Assessment & Plan  ISS      VTE prophylaxis: SCDs/TEDs

## 2021-12-24 NOTE — ASSESSMENT & PLAN NOTE
Developed large-volume hematochezia after ileostomy takedown 12/21  Post-operative BRBPR without hemodynamic instability most consistent with lower GIB  Colorectal Surgery consulted, advised conservative management  IV PPI transitioned to PO PPI BID - low perceived benefit for Lower GIB  Resume regular diet

## 2021-12-24 NOTE — PROGRESS NOTES
Med rec completed per patient and spouse at bedside.  Allergies reviewed with patient. No known drug allergies.  Patient had one-day courses of metronidazole and neomycin on 12/20/2021 prior to surgery. No other antibiotics at home within last 30 days.  Patient's pharmacy: [facility] on [address].

## 2021-12-24 NOTE — ASSESSMENT & PLAN NOTE
Hgb decreased to 9.7-10.1 from 11.2  Due to lower GIB - see separate plan  Repeat Hgb daily  Transfuse for Hgb <8 or hemodynamic instability

## 2021-12-24 NOTE — ED NOTES
Pt ambulate to room with steady gait. Pt reports multiple BM's with blood in stool. Pt is weak, shaky, diaphoretic and pale.

## 2021-12-25 LAB
ALBUMIN SERPL BCP-MCNC: 3.6 G/DL (ref 3.2–4.9)
ALBUMIN/GLOB SERPL: 1.8 G/DL
ALP SERPL-CCNC: 51 U/L (ref 30–99)
ALT SERPL-CCNC: 27 U/L (ref 2–50)
ANION GAP SERPL CALC-SCNC: 11 MMOL/L (ref 7–16)
AST SERPL-CCNC: 20 U/L (ref 12–45)
BASOPHILS # BLD AUTO: 0.8 % (ref 0–1.8)
BASOPHILS # BLD: 0.07 K/UL (ref 0–0.12)
BILIRUB SERPL-MCNC: 0.8 MG/DL (ref 0.1–1.5)
BUN SERPL-MCNC: 12 MG/DL (ref 8–22)
CALCIUM SERPL-MCNC: 8.5 MG/DL (ref 8.5–10.5)
CHLORIDE SERPL-SCNC: 107 MMOL/L (ref 96–112)
CO2 SERPL-SCNC: 24 MMOL/L (ref 20–33)
CREAT SERPL-MCNC: 0.87 MG/DL (ref 0.5–1.4)
EOSINOPHIL # BLD AUTO: 0.19 K/UL (ref 0–0.51)
EOSINOPHIL NFR BLD: 2.2 % (ref 0–6.9)
ERYTHROCYTE [DISTWIDTH] IN BLOOD BY AUTOMATED COUNT: 45.6 FL (ref 35.9–50)
GLOBULIN SER CALC-MCNC: 2 G/DL (ref 1.9–3.5)
GLUCOSE SERPL-MCNC: 104 MG/DL (ref 65–99)
HCT VFR BLD AUTO: 29.8 % (ref 42–52)
HCT VFR BLD AUTO: 30.1 % (ref 42–52)
HGB BLD-MCNC: 10.1 G/DL (ref 14–18)
HGB BLD-MCNC: 9.7 G/DL (ref 14–18)
IMM GRANULOCYTES # BLD AUTO: 0.11 K/UL (ref 0–0.11)
IMM GRANULOCYTES NFR BLD AUTO: 1.3 % (ref 0–0.9)
LYMPHOCYTES # BLD AUTO: 2.53 K/UL (ref 1–4.8)
LYMPHOCYTES NFR BLD: 28.8 % (ref 22–41)
MCH RBC QN AUTO: 27.9 PG (ref 27–33)
MCHC RBC AUTO-ENTMCNC: 32.6 G/DL (ref 33.7–35.3)
MCV RBC AUTO: 85.6 FL (ref 81.4–97.8)
MONOCYTES # BLD AUTO: 0.82 K/UL (ref 0–0.85)
MONOCYTES NFR BLD AUTO: 9.3 % (ref 0–13.4)
NEUTROPHILS # BLD AUTO: 5.06 K/UL (ref 1.82–7.42)
NEUTROPHILS NFR BLD: 57.6 % (ref 44–72)
NRBC # BLD AUTO: 0 K/UL
NRBC BLD-RTO: 0 /100 WBC
PLATELET # BLD AUTO: 224 K/UL (ref 164–446)
PMV BLD AUTO: 9.5 FL (ref 9–12.9)
POTASSIUM SERPL-SCNC: 4.2 MMOL/L (ref 3.6–5.5)
PROT SERPL-MCNC: 5.6 G/DL (ref 6–8.2)
RBC # BLD AUTO: 3.48 M/UL (ref 4.7–6.1)
SODIUM SERPL-SCNC: 142 MMOL/L (ref 135–145)
WBC # BLD AUTO: 8.8 K/UL (ref 4.8–10.8)

## 2021-12-25 PROCEDURE — 85018 HEMOGLOBIN: CPT

## 2021-12-25 PROCEDURE — 700102 HCHG RX REV CODE 250 W/ 637 OVERRIDE(OP): Performed by: STUDENT IN AN ORGANIZED HEALTH CARE EDUCATION/TRAINING PROGRAM

## 2021-12-25 PROCEDURE — 85014 HEMATOCRIT: CPT

## 2021-12-25 PROCEDURE — A9270 NON-COVERED ITEM OR SERVICE: HCPCS | Performed by: STUDENT IN AN ORGANIZED HEALTH CARE EDUCATION/TRAINING PROGRAM

## 2021-12-25 PROCEDURE — 770004 HCHG ROOM/CARE - ONCOLOGY PRIVATE *

## 2021-12-25 PROCEDURE — 36415 COLL VENOUS BLD VENIPUNCTURE: CPT

## 2021-12-25 PROCEDURE — 80053 COMPREHEN METABOLIC PANEL: CPT

## 2021-12-25 PROCEDURE — 85025 COMPLETE CBC W/AUTO DIFF WBC: CPT

## 2021-12-25 PROCEDURE — 99232 SBSQ HOSP IP/OBS MODERATE 35: CPT | Performed by: STUDENT IN AN ORGANIZED HEALTH CARE EDUCATION/TRAINING PROGRAM

## 2021-12-25 RX ADMIN — OMEPRAZOLE 20 MG: 20 CAPSULE, DELAYED RELEASE ORAL at 04:36

## 2021-12-25 RX ADMIN — LORATADINE 10 MG: 10 TABLET ORAL at 04:36

## 2021-12-25 RX ADMIN — OMEPRAZOLE 20 MG: 20 CAPSULE, DELAYED RELEASE ORAL at 18:18

## 2021-12-25 RX ADMIN — Medication 2000 UNITS: at 04:36

## 2021-12-25 ASSESSMENT — ENCOUNTER SYMPTOMS
VOMITING: 0
DIARRHEA: 0
SORE THROAT: 0
NECK PAIN: 0
FLANK PAIN: 0
EYE PAIN: 0
CLAUDICATION: 0
WEAKNESS: 1
ORTHOPNEA: 0
NERVOUS/ANXIOUS: 0
ABDOMINAL PAIN: 0
CHILLS: 0
PALPITATIONS: 0
HEADACHES: 0
SINUS PAIN: 0
NAUSEA: 0
SHORTNESS OF BREATH: 0
BRUISES/BLEEDS EASILY: 0
MYALGIAS: 0
HEMOPTYSIS: 0
FEVER: 0
DIZZINESS: 0
BLOOD IN STOOL: 0
DEPRESSION: 0
CONSTIPATION: 0
BACK PAIN: 0

## 2021-12-25 ASSESSMENT — PAIN DESCRIPTION - PAIN TYPE: TYPE: ACUTE PAIN

## 2021-12-25 NOTE — PROGRESS NOTES
Hospital Medicine Daily Progress Note    Date of Service  12/25/2021    Chief Complaint  Surinder Serrano is a 61 y.o. male admitted 12/23/2021 with hematochezia after ileostomy reversal.    Hospital Course  No notes on file    Interval Problem Update  LATESHA ON  Hgb decreased to 9.7 ON, repeat Hgb 10.1.  He feels weaker and more tired today when ambulating to the bathroom.  He has ongoing bloody BMs. The frequency has decreased, but consistency and color have not.  He denies dyspnea, chest pain, presyncope, syncope, falls.  He denies N/V, other bleeding, pain, dysphoria.    I have personally seen and examined the patient at bedside. I discussed the plan of care with patient, family and bedside RN.    Consultants/Specialty  colorectal surgery    Code Status  Full Code    Disposition  Patient is not medically cleared for discharge.   Anticipate discharge to to home with close outpatient follow-up.  I have placed the appropriate orders for post-discharge needs.    Review of Systems  Review of Systems   Constitutional: Positive for malaise/fatigue. Negative for chills and fever.   HENT: Negative for ear pain, nosebleeds, sinus pain and sore throat.    Eyes: Negative for pain.   Respiratory: Negative for hemoptysis and shortness of breath.    Cardiovascular: Negative for chest pain, palpitations, orthopnea, claudication and leg swelling.   Gastrointestinal: Negative for abdominal pain, blood in stool, constipation, diarrhea, melena, nausea and vomiting.   Genitourinary: Negative for dysuria, flank pain and hematuria.   Musculoskeletal: Negative for back pain, joint pain, myalgias and neck pain.   Neurological: Positive for weakness. Negative for dizziness and headaches.   Endo/Heme/Allergies: Does not bruise/bleed easily.   Psychiatric/Behavioral: Negative for depression. The patient is not nervous/anxious.         Physical Exam  Temp:  [36.4 °C (97.6 °F)-37.2 °C (98.9 °F)] 36.9 °C (98.5 °F)  Pulse:  [64-98] 91  Resp:   [18] 18  BP: (116-124)/(60-85) 116/67  SpO2:  [92 %-97 %] 97 %    Physical Exam  Vitals and nursing note reviewed. Exam conducted with a chaperone present (Wife at bedside).   Constitutional:       General: He is not in acute distress.     Appearance: He is not ill-appearing, toxic-appearing or diaphoretic.   HENT:      Head: Normocephalic.      Nose: Nose normal.      Mouth/Throat:      Mouth: Mucous membranes are dry.   Eyes:      General: No scleral icterus.     Conjunctiva/sclera: Conjunctivae normal.   Cardiovascular:      Rate and Rhythm: Normal rate and regular rhythm.      Pulses: Normal pulses.      Heart sounds: Normal heart sounds. No murmur heard.  No friction rub. No gallop.    Pulmonary:      Effort: Pulmonary effort is normal. No respiratory distress.      Breath sounds: Normal breath sounds. No wheezing, rhonchi or rales.   Abdominal:      General: Abdomen is protuberant. Bowel sounds are normal. There is no distension.      Palpations: Abdomen is soft.      Tenderness: There is no abdominal tenderness. There is no guarding or rebound.      Comments: RLQ incision stapled, nonerythematous, nontender   Genitourinary:     Comments: No cooley  Musculoskeletal:      Right lower leg: No edema.      Left lower leg: No edema.   Skin:     General: Skin is warm and dry.   Neurological:      Mental Status: He is alert.      Comments: Appropriately conversant   Psychiatric:         Attention and Perception: Attention and perception normal.         Mood and Affect: Mood and affect normal.         Speech: Speech normal.         Behavior: Behavior normal. Behavior is cooperative.         Thought Content: Thought content normal.         Cognition and Memory: Cognition and memory normal.         Judgment: Judgment normal.         Fluids  No intake or output data in the 24 hours ending 12/25/21 1502    Laboratory  Recent Labs     12/23/21  1855 12/23/21  1855 12/24/21  0036 12/24/21  0949 12/24/21  1702 12/25/21  0155  12/25/21  0733   WBC 16.8*  --  10.7  --   --  8.8  --    RBC 5.16  --  4.00*  --   --  3.48*  --    HEMOGLOBIN 14.4   < > 11.5*   < > 11.2* 9.7* 10.1*   HEMATOCRIT 43.1   < > 33.3*   < > 34.3* 29.8* 30.1*   MCV 83.5  --  83.3  --   --  85.6  --    MCH 27.9  --  28.8  --   --  27.9  --    MCHC 33.4*  --  34.5  --   --  32.6*  --    RDW 43.1  --  42.5  --   --  45.6  --    PLATELETCT 392  --  265  --   --  224  --    MPV 9.6  --  9.7  --   --  9.5  --     < > = values in this interval not displayed.     Recent Labs     12/23/21  1855 12/24/21  0036 12/25/21  0155   SODIUM 137 137 142   POTASSIUM 3.8 4.1 4.2   CHLORIDE 103 106 107   CO2 17* 21 24   GLUCOSE 174* 128* 104*   BUN 14 14 12   CREATININE 0.89 0.65 0.87   CALCIUM 9.3 8.4* 8.5     Recent Labs     12/23/21  1855   APTT 28.7   INR 1.15*               Imaging  DX-CHEST-PORTABLE (1 VIEW)   Final Result      Mild left basilar atelectasis.           Assessment/Plan  * Acute lower GI bleeding- (present on admission)  Assessment & Plan  Developed large-volume hematochezia after ileostomy takedown 12/21  Post-operative BRBPR without hemodynamic instability most consistent with lower GIB  Colorectal Surgery consulted, advised conservative management  IV PPI transitioned to PO PPI BID - low perceived benefit for Lower GIB  Resume regular diet    Acute blood loss anemia- (present on admission)  Assessment & Plan  Hgb decreased to 9.7-10.1 from 11.2  Due to lower GIB - see separate plan  Repeat Hgb daily  Transfuse for Hgb <8 or hemodynamic instability    Increased anion gap metabolic acidosis- (present on admission)  Assessment & Plan  Likely due to ABLA  Resolved with IVF    Rectosigmoid cancer (HCC)- (present on admission)  Assessment & Plan  Surgically resected by LAR with loop ileostomy 9/21/21 with Dr. Camacho  Pathology without residual carcinoma 0/11 nodes  Ileostomy reversed 12/21/21 with Dr. Camacho  Follow up with Dr. Camacho for post-operative  care    Hyperglycemia- (present on admission)  Assessment & Plan  Due to prediabetes  No indication for strict BG control, POCT + SSI discontinued    Bandemia- (present on admission)  Assessment & Plan  Resolved  Likely reactive       VTE prophylaxis: SCDs/TEDs and pharmacologic prophylaxis contraindicated due to lower GIB    I have performed a physical exam and reviewed and updated ROS and Plan today (12/25/2021). In review of yesterday's note (12/24/2021), there are no changes except as documented above.

## 2021-12-25 NOTE — CARE PLAN
Problem: Knowledge Deficit - Standard  Goal: Patient and family/care givers will demonstrate understanding of plan of care, disease process/condition, diagnostic tests and medications  Outcome: Progressing   The patient is Watcher - Medium risk of patient condition declining or worsening    Shift Goals  Clinical Goals: monitor H/H  Patient Goals: reduce amount of blood in stool  Family Goals: N/A

## 2021-12-25 NOTE — PROGRESS NOTES
"S:  61 y.o.male s/p ileostomy closure  POD# 4.  Patient had fewer bloody bowel movements overnight.    O:  /71   Pulse 64   Temp 37.1 °C (98.8 °F) (Temporal)   Resp 18   Ht 1.753 m (5' 9\")   Wt 94.7 kg (208 lb 12.4 oz)   SpO2 96%   No intake/output data recorded.  Recent Labs     12/23/21 1855 12/24/21 0036 12/25/21 0155   SODIUM 137 137 142   POTASSIUM 3.8 4.1 4.2   CHLORIDE 103 106 107   CO2 17* 21 24   GLUCOSE 174* 128* 104*   BUN 14 14 12   CREATININE 0.89 0.65 0.87   CALCIUM 9.3 8.4* 8.5     Recent Labs     12/23/21 1855 12/23/21 1855 12/24/21 0036 12/24/21  0949 12/24/21  1702 12/25/21  0155 12/25/21  0733   WBC 16.8*  --  10.7  --   --  8.8  --    RBC 5.16  --  4.00*  --   --  3.48*  --    HEMOGLOBIN 14.4   < > 11.5*   < > 11.2* 9.7* 10.1*   HEMATOCRIT 43.1   < > 33.3*   < > 34.3* 29.8* 30.1*   MCV 83.5  --  83.3  --   --  85.6  --    MCH 27.9  --  28.8  --   --  27.9  --    MCHC 33.4*  --  34.5  --   --  32.6*  --    RDW 43.1  --  42.5  --   --  45.6  --    PLATELETCT 392  --  265  --   --  224  --    MPV 9.6  --  9.7  --   --  9.5  --     < > = values in this interval not displayed.       Alert and Oriented x3, No Acute Distress  Normal Respiratory Effort  Abdomen soft, appropriately tender  Incisions/Bandages clean/dry/intact  Extremities warm and well perfused      A/P:bleeding per rectum after ileostomy reversal  Patient not amenable to endoscopic intervention due to location of the anastomosis  Hold DVT prophylaxis and encourage ambulation  Clear liquids  Discontinue serial hemoglobins  Hold transfusion for hemodynamic instability or hemoglobin less than 8  Stat H&H if patient demonstrates tachycardia, syncope or hypotension  Damian Camacho M.D. PhD  [facility]  Colon and Rectal Surgery  (Office) [phone]  (cell) [phone]  "

## 2021-12-25 NOTE — PROGRESS NOTES
"Hospital Medicine Daily Progress Note    Date of Service  12/24/2021    Chief Complaint  Surinder Serrano is a 61 y.o. male admitted 12/23/2021 with hematochezia after ileostomy reversal.    Hospital Course  No notes on file    Interval Problem Update  He had 2 additional loose bloody BMs ON with a small amount of stool in them.  He reports feeling scared from the brisk blood loss and feels like it's a \"horror show\" in the bathroom after multiple bloody BMs.  He felt weak yesterday but today denies dyspnea, presyncope, weakness, falls, chest tightness.  He denies other bleeding sites.  He has slight pain in his RLQ from the surgery but no other pain.  He denies F/C, N/V, bladder dysfunction,    I have personally seen and examined the patient at bedside. I discussed the plan of care with patient, family, bedside RN, charge RN, , pharmacy and colorectal surgery.    POC discussed with Colorectal Surgeon Dr. Camacho. He advised the anastamosis is inaccessible by endoscopy. He recommended NPO and to monitor blood loss.    Consultants/Specialty  colorectal surgery    Code Status  Full Code    Disposition  Patient is not medically cleared for discharge.   Anticipate discharge to to home with close outpatient follow-up.  I have placed the appropriate orders for post-discharge needs.    Review of Systems  Review of Systems   Constitutional: Negative for chills and fever.   HENT: Negative for ear pain, nosebleeds, sinus pain and sore throat.    Eyes: Negative for pain.   Respiratory: Negative for hemoptysis and shortness of breath.    Cardiovascular: Negative for chest pain, palpitations, orthopnea, claudication and leg swelling.   Gastrointestinal: Positive for abdominal pain. Negative for blood in stool, constipation, diarrhea, melena, nausea and vomiting.   Genitourinary: Negative for dysuria, flank pain and hematuria.   Musculoskeletal: Negative for back pain, joint pain, myalgias and neck pain. "   Neurological: Negative for dizziness, weakness and headaches.   Endo/Heme/Allergies: Does not bruise/bleed easily.   Psychiatric/Behavioral: Negative for depression. The patient is nervous/anxious.         Physical Exam  Temp:  [36.5 °C (97.7 °F)-37.2 °C (98.9 °F)] 37.2 °C (98.9 °F)  Pulse:  [] 98  Resp:  [16-18] 18  BP: (105-150)/() 124/71  SpO2:  [93 %-98 %] 97 %    Physical Exam  Vitals and nursing note reviewed.   Constitutional:       General: He is not in acute distress.     Appearance: He is not ill-appearing, toxic-appearing or diaphoretic.   HENT:      Head: Normocephalic.      Nose: Nose normal.      Mouth/Throat:      Mouth: Mucous membranes are dry.   Eyes:      General: No scleral icterus.     Conjunctiva/sclera: Conjunctivae normal.   Cardiovascular:      Rate and Rhythm: Normal rate and regular rhythm.      Pulses: Normal pulses.      Heart sounds: Normal heart sounds. No murmur heard.  No friction rub. No gallop.    Pulmonary:      Effort: Pulmonary effort is normal. No respiratory distress.      Breath sounds: Normal breath sounds. No wheezing, rhonchi or rales.   Abdominal:      General: Abdomen is protuberant. Bowel sounds are normal. There is no distension.      Palpations: Abdomen is soft.      Tenderness: There is no abdominal tenderness. There is no guarding or rebound.      Comments: RLQ incision stapled, nonerythematous, nontender   Genitourinary:     Comments: No cooley.  Dark blood in hat of toilet.  Musculoskeletal:      Right lower leg: No edema.      Left lower leg: No edema.   Skin:     General: Skin is warm and dry.   Neurological:      Mental Status: He is alert.      Comments: Appropriately conversant   Psychiatric:         Attention and Perception: Attention and perception normal.         Mood and Affect: Mood and affect normal.         Speech: Speech normal.         Behavior: Behavior normal. Behavior is cooperative.         Thought Content: Thought content normal.          Cognition and Memory: Cognition and memory normal.         Judgment: Judgment normal.         Fluids  No intake or output data in the 24 hours ending 12/24/21 1713    Laboratory  Recent Labs     12/23/21  0501 12/23/21  0501 12/23/21 1855 12/24/21  0036 12/24/21  0949   WBC 12.1*  --  16.8* 10.7  --    RBC 4.99  --  5.16 4.00*  --    HEMOGLOBIN 14.1   < > 14.4 11.5* 11.2*   HEMATOCRIT 41.4*   < > 43.1 33.3* 32.6*   MCV 83.0  --  83.5 83.3  --    MCH 28.3  --  27.9 28.8  --    MCHC 34.1  --  33.4* 34.5  --    RDW 42.5  --  43.1 42.5  --    PLATELETCT 266  --  392 265  --    MPV 9.7  --  9.6 9.7  --     < > = values in this interval not displayed.     Recent Labs     12/23/21  0501 12/23/21 1855 12/24/21  0036   SODIUM 136 137 137   POTASSIUM 3.9 3.8 4.1   CHLORIDE 100 103 106   CO2 22 17* 21   GLUCOSE 117* 174* 128*   BUN 15 14 14   CREATININE 0.79 0.89 0.65   CALCIUM 9.0 9.3 8.4*     Recent Labs     12/23/21 1855   APTT 28.7   INR 1.15*               Imaging  DX-CHEST-PORTABLE (1 VIEW)   Final Result      Mild left basilar atelectasis.           Assessment/Plan  * Acute lower GI bleeding- (present on admission)  Assessment & Plan  Developed large-volume hematochezia after ileostomy takedown 12/21  Post-operative BRBPR without hemodynamic instability most consistent with lower GIB  Colorectal Surgery consulted, advised conservative management  IV PPI transitioned to PO PPI BID - low perceived benefit for Lower GIB  NPO except sips with meds and ice chips    Acute blood loss anemia- (present on admission)  Assessment & Plan  Hgb decreased to 11.5 from 14.4, now stable at 11.2  Due to lower GIB - see separate plan  Repeat Hgb q8h  Transfuse for Hgb <8 or hemodynamic instability    Increased anion gap metabolic acidosis- (present on admission)  Assessment & Plan  Likely due to ABLA  Resolved with IVF    Rectosigmoid cancer (HCC)- (present on admission)  Assessment & Plan  Surgically resected by LAR with loop  ileostomy 9/21/21 with Dr. Camacho  Pathology without residual carcinoma 0/11 nodes  Ileostomy reversed 12/21/21 with Dr. Camacho  Follow up with Dr. Camacho for post-operative care    Hyperglycemia- (present on admission)  Assessment & Plan  Due to prediabetes  No indication for strict BG control, POCT + SSI discontinued    Bandemia- (present on admission)  Assessment & Plan  Resolved  Likely reactive       VTE prophylaxis: SCDs/TEDs and pharmacologic prophylaxis contraindicated due to lower GIB    I have performed a physical exam and reviewed and updated ROS and Plan today (12/24/2021). In review of yesterday's note (12/23/2021), there are no changes except as documented above.

## 2021-12-26 VITALS
SYSTOLIC BLOOD PRESSURE: 109 MMHG | DIASTOLIC BLOOD PRESSURE: 69 MMHG | BODY MASS INDEX: 30.92 KG/M2 | OXYGEN SATURATION: 99 % | WEIGHT: 208.78 LBS | HEART RATE: 74 BPM | HEIGHT: 69 IN | TEMPERATURE: 98.4 F | RESPIRATION RATE: 17 BRPM

## 2021-12-26 PROBLEM — R73.9 HYPERGLYCEMIA: Status: RESOLVED | Noted: 2021-12-24 | Resolved: 2021-12-26

## 2021-12-26 PROBLEM — C19 RECTOSIGMOID CANCER (HCC): Chronic | Status: RESOLVED | Noted: 2021-09-20 | Resolved: 2021-12-26

## 2021-12-26 PROBLEM — D72.825 BANDEMIA: Status: RESOLVED | Noted: 2021-12-24 | Resolved: 2021-12-26

## 2021-12-26 PROBLEM — K92.2 ACUTE LOWER GI BLEEDING: Status: RESOLVED | Noted: 2021-12-24 | Resolved: 2021-12-26

## 2021-12-26 PROBLEM — E87.29 INCREASED ANION GAP METABOLIC ACIDOSIS: Status: RESOLVED | Noted: 2021-12-24 | Resolved: 2021-12-26

## 2021-12-26 LAB — HGB BLD-MCNC: 10.5 G/DL (ref 14–18)

## 2021-12-26 PROCEDURE — 36415 COLL VENOUS BLD VENIPUNCTURE: CPT

## 2021-12-26 PROCEDURE — A9270 NON-COVERED ITEM OR SERVICE: HCPCS | Performed by: STUDENT IN AN ORGANIZED HEALTH CARE EDUCATION/TRAINING PROGRAM

## 2021-12-26 PROCEDURE — 85018 HEMOGLOBIN: CPT

## 2021-12-26 PROCEDURE — 99239 HOSP IP/OBS DSCHRG MGMT >30: CPT | Performed by: STUDENT IN AN ORGANIZED HEALTH CARE EDUCATION/TRAINING PROGRAM

## 2021-12-26 PROCEDURE — 700102 HCHG RX REV CODE 250 W/ 637 OVERRIDE(OP): Performed by: STUDENT IN AN ORGANIZED HEALTH CARE EDUCATION/TRAINING PROGRAM

## 2021-12-26 RX ADMIN — LORATADINE 10 MG: 10 TABLET ORAL at 05:51

## 2021-12-26 RX ADMIN — OMEPRAZOLE 20 MG: 20 CAPSULE, DELAYED RELEASE ORAL at 05:51

## 2021-12-26 RX ADMIN — Medication 2000 UNITS: at 05:51

## 2021-12-26 ASSESSMENT — PAIN DESCRIPTION - PAIN TYPE: TYPE: ACUTE PAIN

## 2021-12-26 NOTE — CARE PLAN
The patient is Stable - Low risk of patient condition declining or worsening    Shift Goals  Clinical Goals: Monitor labs / Remain free of falls  Patient Goals: Discharge  Family Goals: N/A    Progress made toward(s) clinical / shift goals:    Problem: Knowledge Deficit - Standard  Goal: Patient and family/care givers will demonstrate understanding of plan of care, disease process/condition, diagnostic tests including labs,their results and medications.  Outcome: Progressing       Patient is not progressing towards the following goals:

## 2021-12-26 NOTE — DISCHARGE SUMMARY
Discharge Summary    CHIEF COMPLAINT ON ADMISSION  Elective ileostomy closure    Reason for Admission  ILEOSTOMY PRESENT     Admission Date  12/21/2021    CODE STATUS  Prior    HPI & HOSPITAL COURSE  This is a 61 y.o. male here with history of rectal cancer status post low anterior resection with diverting loop ileostomy here for definitive ileostomy closure.  Patient recovered quickly and is ready almost postoperative day 2.    Therefore, he is discharged in good and stable condition to home with close outpatient follow-up.    The patient met 2-midnight criteria for an inpatient stay at the time of discharge.    Discharge Date  12/23/2021    FOLLOW UP ITEMS POST DISCHARGE  Bowel function    DISCHARGE DIAGNOSES  Principal Problem:    Ileostomy present (HCC) POA: Yes  Resolved Problems:    * No resolved hospital problems. *      FOLLOW UP  No future appointments.  Damian Camacho M.D.  [address]  [facility] [address]  [facility] [address] [phone]    Schedule an appointment as soon as possible for a visit in 7 days  For wound re-check, Routine follow-up    Nesha Matias D.O.  [address]  [facility] [address] [phone]    Call  Please call your Primary Care Provider to schedule an appointment as needed.       MEDICATIONS ON DISCHARGE     Medication List      CONTINUE taking these medications      Instructions   loratadine 10 MG Tabs  Commonly known as: CLARITIN   Take 10 mg by mouth 1 time a day as needed (Seasonal Allergies).  Dose: 10 mg     omeprazole 20 MG delayed-release capsule  Commonly known as: PRILOSEC   Take 20 mg by mouth every morning.  Dose: 20 mg     Vitamin D 50 MCG (2000 UT) Caps   Take 2,000 Units by mouth every morning.  Dose: 2,000 Units            Allergies  No Known Allergies    DIET  No orders of the defined types were placed in this encounter.      ACTIVITY  As tolerated.  Exercise encouraged.  10-lb lifting restriction    CONSULTATIONS  Pathology    PROCEDURES  Ileostomy  closure    LABORATORY  Lab Results   Component Value Date    SODIUM 142 12/25/2021    POTASSIUM 4.2 12/25/2021    CHLORIDE 107 12/25/2021    CO2 24 12/25/2021    GLUCOSE 104 (H) 12/25/2021    BUN 12 12/25/2021    CREATININE 0.87 12/25/2021        Lab Results   Component Value Date    WBC 8.8 12/25/2021    HEMOGLOBIN 10.5 (L) 12/26/2021    HEMATOCRIT 30.1 (L) 12/25/2021    PLATELETCT 224 12/25/2021        Total time of the discharge process exceeds 25 minutes.

## 2021-12-26 NOTE — CARE PLAN
Problem: Knowledge Deficit - Standard  Goal: Patient and family/care givers will demonstrate understanding of plan of care, disease process/condition, diagnostic tests and medications  Outcome: Progressing   The patient is Watcher - Medium risk of patient condition declining or worsening    Shift Goals  Clinical Goals: Monitor labs, safety  Patient Goals: rest  Family Goals: N/A

## 2021-12-26 NOTE — DISCHARGE SUMMARY
Discharge Summary    CHIEF COMPLAINT ON ADMISSION  Chief Complaint   Patient presents with   • Post-Op Complications     discharged from [facility] this morning after ileostomy reversal due to colon cancer.  Pt reports no appetite and fatigue along with rectal bleeding   • Bloody Stools       Reason for Admission  Post-Operative Bleeding     Admission Date  12/23/2021    CODE STATUS  Full Code    HPI & HOSPITAL COURSE  This is a 61 y.o. male with Stage I colorectal cancer s/p partial colectomy with diverting ileostomy, prediabetes here with post-operative hematochezia and acute blood loss anemia after reversal of diverting ileostomy with anastamosis with Dr. Camacho 12/21/21.  He returned the evening after discharge with BRBPR which persisted overnight and resulted in decrease in Hgb from 14.4 to 11.5. Dr. Camacho was consulted and advised conservative management due to inability to reach the anastamosis by endoscopy. He was monitored with serial hemoglobin assessment until Hgb stabilized at 10.5. Hematochezia improved and he tolerated a regular diet prior to discharge.    Therefore, he is discharged in good and stable condition to home with close outpatient follow-up.    The patient met 2-midnight criteria for an inpatient stay at the time of discharge.    Discharge Date  12/26/21    FOLLOW UP ITEMS POST DISCHARGE  1. Postoperative care - follow up with Dr. Camacho as scheduled    DISCHARGE DIAGNOSES  Principal Problem (Resolved):    Acute lower GI bleeding POA: Yes  Active Problems:    Acute blood loss anemia POA: Yes  Resolved Problems:    Rectosigmoid cancer (HCC) (Chronic) POA: Yes    Increased anion gap metabolic acidosis POA: Yes    Bandemia POA: Yes    Hyperglycemia POA: Yes      FOLLOW UP  No future appointments.  Damian Camacho M.D.  [address] [facility] [address]  [place] [id number] [phone]    Go to  after-surgery follow up    Nesha Matias D.O.  [address]  [place]  [address]  [phone]    Schedule an appointment as soon as possible for a visit in 3 weeks  after-hospital follow up      MEDICATIONS ON DISCHARGE     Medication List      CONTINUE taking these medications      Instructions   loratadine 10 MG Tabs  Commonly known as: CLARITIN   Take 10 mg by mouth 1 time a day as needed (Seasonal Allergies).  Dose: 10 mg     omeprazole 20 MG delayed-release capsule  Commonly known as: PRILOSEC   Take 20 mg by mouth every morning.  Dose: 20 mg     Vitamin D 50 MCG (2000 UT) Caps   Take 2,000 Units by mouth every morning.  Dose: 2,000 Units        STOP taking these medications    metroNIDAZOLE 500 MG Tabs  Commonly known as: FLAGYL     neomycin 500 MG Tabs  Commonly known as: MYCIFRADIN            Allergies  No Known Allergies    DIET  Orders Placed This Encounter   Procedures   • Diet Order Diet: Regular     Standing Status:   Standing     Number of Occurrences:   1     Order Specific Question:   Diet:     Answer:   Regular [1]       ACTIVITY  As tolerated.  Weight bearing as tolerated    CONSULTATIONS  Colorectal Surgery    PROCEDURES  None    LABORATORY  Lab Results   Component Value Date    SODIUM 142 12/25/2021    POTASSIUM 4.2 12/25/2021    CHLORIDE 107 12/25/2021    CO2 24 12/25/2021    GLUCOSE 104 (H) 12/25/2021    BUN 12 12/25/2021    CREATININE 0.87 12/25/2021        Lab Results   Component Value Date    WBC 8.8 12/25/2021    HEMOGLOBIN 10.5 (L) 12/26/2021    HEMATOCRIT 30.1 (L) 12/25/2021    PLATELETCT 224 12/25/2021        Total time of the discharge process exceeds 35 minutes.

## 2021-12-26 NOTE — CARE PLAN
Problem: Knowledge Deficit - Standard  Goal: Patient and family/care givers will demonstrate understanding of plan of care, disease process/condition, diagnostic tests and medications  Outcome: Progressing   The patient is stable    Shift Goals  Clinical Goals: monitor H/H  Patient Goals: reduce amount of blood in stool  Family Goals: N/A    Progress made toward(s) clinical / shift goals: less blood in stool    Patient is not progressing towards the following goals:none

## 2021-12-26 NOTE — DISCHARGE INSTRUCTIONS
"  Discharge Instructions per Deon Willis M.D.    You were hospitalized for bleeding from your bowels after surgery. The blood counts stabilized and your bleeding slowed down.  You did not require additional surgery with Dr. Camacho    DIET: Regular    ACTIVITY: Regular    DIAGNOSIS: Post-operative bleeding    Call Dr. Camacho' office and follow instructions to possibly return to ER if you faint for any reason, develop chest pain or difficulty breathing, or if your bowel movements become grossly bloody again.      Bloody Stools  Bloody stools often mean that there is a problem in the digestive tract. Your caregiver may use the term \"melena\" to describe black, tarry, and bad smelling stools or \"hematochezia\" to describe red or maroon-colored stools. Blood seen in the stool can be caused by bleeding anywhere along the intestinal tract.   A black stool usually means that blood is coming from the upper part of the gastrointestinal tract (esophagus, stomach, or small bowel). Passing maroon-colored stools or bright red blood usually means that blood is coming from lower down in the large bowel or the rectum. However, sometimes massive bleeding in the stomach or small intestine can cause bright red bloody stools.   Consuming black licorice, lead, iron pills, medicines containing bismuth subsalicylate, or blueberries can also cause black stools. Your caregiver can test black stools to see if blood is present.  It is important that the cause of the bleeding be found. Treatment can then be started, and the problem can be corrected. Rectal bleeding may not be serious, but you should not assume everything is okay until you know the cause. It is very important to follow up with your caregiver or a specialist in gastrointestinal problems.  CAUSES   Blood in the stools can come from various underlying causes. Often, the cause is not found during your first visit. Testing is often needed to discover the cause of bleeding in the " gastrointestinal tract. Causes range from simple to serious or even life-threatening. Possible causes include:  · Hemorrhoids. These are veins that are full of blood (engorged) in the rectum. They cause pain, inflammation, and may bleed.  · Anal fissures. These are areas of painful tearing which may bleed. They are often caused by passing hard stool.  · Diverticulosis. These are pouches that form on the colon over time, with age, and may bleed significantly.  · Diverticulitis. This is inflammation in areas with diverticulosis. It can cause pain, fever, and bloody stools, although bleeding is rare.  · Proctitis and colitis. These are inflamed areas of the rectum or colon. They may cause pain, fever, and bloody stools.  · Polyps and cancer. Colon cancer is a leading cause of preventable cancer death. It often starts out as precancerous polyps that can be removed during a colonoscopy, preventing progression into cancer. Sometimes, polyps and cancer may cause rectal bleeding.  · Gastritis and ulcers. Bleeding from the upper gastrointestinal tract (near the stomach) may travel through the intestines and produce black, sometimes tarry, often bad smelling stools. In certain cases, if the bleeding is fast enough, the stools may not be black, but red and the condition may be life-threatening.  SYMPTOMS   You may have stools that are bright red and bloody, that are normal color with blood on them, or that are dark black and tarry. In some cases, you may only have blood in the toilet bowl. Any of these cases need medical care. You may also have:  · Pain at the anus or anywhere in the rectum.  · Lightheadedness or feeling faint.  · Extreme weakness.  · Nausea or vomiting.  · Fever.  DIAGNOSIS  Your caregiver may use the following methods to find the cause of your bleeding:  · Taking a medical history. Age is important. Older people tend to develop polyps and cancer more often. If there is anal pain and a hard, large stool  associated with bleeding, a tear of the anus may be the cause. If blood drips into the toilet after a bowel movement, bleeding hemorrhoids may be the problem. The color and frequency of the bleeding are additional considerations. In most cases, the medical history provides clues, but seldom the final answer.  · A visual and finger (digital) exam. Your caregiver will inspect the anal area, looking for tears and hemorrhoids. A finger exam can provide information when there is tenderness or a growth inside. In men, the prostate is also examined.  · Endoscopy. Several types of small, long scopes (endoscopes) are used to view the colon.  · In the office, your caregiver may use a rigid, or more commonly, a flexible viewing sigmoidoscope. This exam is called flexible sigmoidoscopy. It is performed in 5 to 10 minutes.  · A more thorough exam is accomplished with a colonoscope. It allows your caregiver to view the entire 5 to 6 foot long colon. Medicine to help you relax (sedative) is usually given for this exam. Frequently, a bleeding lesion may be present beyond the reach of the sigmoidoscope. So, a colonoscopy may be the best exam to start with. Both exams are usually done on an outpatient basis. This means the patient does not stay overnight in the hospital or surgery center.  · An upper endoscopy may be needed to examine your stomach. Sedation is used and a flexible endoscope is put in your mouth, down to your stomach.  · A barium enema X-ray. This is an X-ray exam. It uses liquid barium inserted by enema into the rectum. This test alone may not identify an actual bleeding point. X-rays highlight abnormal shadows, such as those made by lumps (tumors), diverticuli, or colitis.  TREATMENT   Treatment depends on the cause of your bleeding.   · For bleeding from the stomach or colon, the caregiver doing your endoscopy or colonoscopy may be able to stop the bleeding as part of the procedure.  · Inflammation or infection of  the colon can be treated with medicines.  · Many rectal problems can be treated with creams, suppositories, or warm baths.  · Surgery is sometimes needed.  · Blood transfusions are sometimes needed if you have lost a lot of blood.  · For any bleeding problem, let your caregiver know if you take aspirin or other blood thinners regularly.  HOME CARE INSTRUCTIONS   · Take any medicines exactly as prescribed.  · Keep your stools soft by eating a diet high in fiber. Prunes (1 to 3 a day) work well for many people.  · Drink enough water and fluids to keep your urine clear or pale yellow.  · Take sitz baths if advised. A sitz bath is when you sit in a bathtub with warm water for 10 to 15 minutes to soak, soothe, and cleanse the rectal area.  · If enemas or suppositories are advised, be sure you know how to use them. Tell your caregiver if you have problems with this.  · Monitor your bowel movements to look for signs of improvement or worsening.  SEEK MEDICAL CARE IF:   · You do not improve in the time expected.  · Your condition worsens after initial improvement.  · You develop any new symptoms.  SEEK IMMEDIATE MEDICAL CARE IF:   · You develop severe or prolonged rectal bleeding.  · You vomit blood.  · You feel weak or faint.  · You have a fever.  MAKE SURE YOU:  · Understand these instructions.  · Will watch your condition.  · Will get help right away if you are not doing well or get worse.  Document Released: 12/08/2003 Document Revised: 03/11/2013 Document Reviewed: 05/04/2012  [employer] Patient Information ©2014 [employer].      Discharge Instructions    Discharged to home by car with relative. Discharged via walking, hospital escort: Refused.  Special equipment needed: Not Applicable    Be sure to schedule a follow-up appointment with your primary care doctor or any specialists as instructed.     Discharge Plan:        I understand that a diet low in cholesterol, fat, and sodium is recommended for good health.  Unless I have been given specific instructions below for another diet, I accept this instruction as my diet prescription.   Other diet: NA    Special Instructions: None    · Is patient discharged on Warfarin / Coumadin?   No     Depression / Suicide Risk    As you are discharged from this [facility] facility, it is important to learn how to keep safe from harming yourself.    Recognize the warning signs:  · Abrupt changes in personality, positive or negative- including increase in energy   · Giving away possessions  · Change in eating patterns- significant weight changes-  positive or negative  · Change in sleeping patterns- unable to sleep or sleeping all the time   · Unwillingness or inability to communicate  · Depression  · Unusual sadness, discouragement and loneliness  · Talk of wanting to die  · Neglect of personal appearance   · Rebelliousness- reckless behavior  · Withdrawal from people/activities they love  · Confusion- inability to concentrate     If you or a loved one observes any of these behaviors or has concerns about self-harm, here's what you can do:  · Talk about it- your feelings and reasons for harming yourself  · Remove any means that you might use to hurt yourself (examples: pills, rope, extension cords, firearm)  · Get professional help from the community (Mental Health, Substance Abuse, psychological counseling)  · Do not be alone:Call your Safe Contact- someone whom you trust who will be there for you.  · Call your local CRISIS HOTLINE 049-6451 or 235-372-2462  · Call your local Children's Mobile Crisis Response Team [place] (905) 624-6164 or www.[employer]  · Call the toll free National Suicide Prevention Hotlines   · National Suicide Prevention Lifeline 213-914-AIFB (1464)  · National Hope Line Network 800-SUICIDE (265-9931)

## 2021-12-27 LAB — HAPTOGLOB SERPL-MCNC: 182 MG/DL (ref 30–200)

## 2022-03-14 NOTE — OR NURSING
COVID-19 Pre-surgery screening:  ?  1. Do you have an undiagnosed respiratory illness or symptoms such as coughing or sneezing, SOB, loss of taste or smell? (Yes/No)      2. Do you have an unexplained fever greater than 100.4 degrees Fahrenheit or 38 degrees Celsius? (Yes/No)     3. Have you had direct exposure to a patient who tested positive for Covid-19? (Yes/No)  ?  4. Have you traveled within the last 14 days? (Yes/No)  ?  Informed of visitor policy and mask use requirement         location identified, draped/prepped, sterile technique used/blood seen on insertion/dressing applied/flushes easily/secured in place

## (undated) DEVICE — SUTURE 1 PDS PLUS CT-1 36IN (36EA/BX)

## (undated) DEVICE — TOWELS CLOTH SURGICAL - (4/PK 20PK/CA)

## (undated) DEVICE — RELOAD STAPLER SUREFORM 60 GREEN (12EA/BX)

## (undated) DEVICE — STAPLER 75MM LINEAR OPEN (3EA/BX)

## (undated) DEVICE — CHLORAPREP 26 ML APPLICATOR - ORANGE TINT(25/CA)

## (undated) DEVICE — DRAPE COLUMN  BOX OF 20

## (undated) DEVICE — DRAPE IOBAN II INCISE 23X17 - (10EA/BX 4BX/CA)

## (undated) DEVICE — SENSOR SPO2 NEO LNCS ADHESIVE (20/BX) SEE USER NOTES

## (undated) DEVICE — SPONGE GAUZESTER 4 X 4 4PLY - (128PK/CA)

## (undated) DEVICE — STAPLER CIRCULAR POWERED 29MM ECHELON (3EA/BX)

## (undated) DEVICE — GOWN SURGEONS X-LARGE - DISP. (30/CA)

## (undated) DEVICE — RESERVOIR SUCTION 100 CC - SILICONE (20EA/CA)

## (undated) DEVICE — ROBOTIC SURGERY SERVICES

## (undated) DEVICE — PAD OR TABLE DA VINCI 2IN X 20IN X 72IN - (12EA/CA)

## (undated) DEVICE — SHEARS MONOPOLAR CURVED  DA VINCI 10X'S REUSABLE

## (undated) DEVICE — PAD LAP STERILE 18 X 18 - (5/PK 40PK/CA)

## (undated) DEVICE — TUBING CLEARLINK DUO-VENT - C-FLO (48EA/CA)

## (undated) DEVICE — STAPLER 60MM BLUE 3.5MM WITH - STAPLE (3EA/BX)

## (undated) DEVICE — TUBE CONNECT SUCTION CLEAR 120 X 1/4" (50EA/CA)"

## (undated) DEVICE — DRAPE MAYO STAND - (30/CA)

## (undated) DEVICE — KIT CUSTOM PROCEDURE SINGLE FOR ENDO  (15/CA)

## (undated) DEVICE — PACK MAJOR BASIN - (2EA/CA)

## (undated) DEVICE — ELECTRODE DUAL RETURN W/ CORD - (50/PK)

## (undated) DEVICE — TOWEL STOP TIMEOUT SAFETY FLAG (40EA/CA)

## (undated) DEVICE — SLEEVE, VASO, THIGH, MED

## (undated) DEVICE — SUTURE 3-0 VICRYL PLUS SH - 8X 18 INCH (12/BX)

## (undated) DEVICE — CANISTER SUCTION 3000ML MECHANICAL FILTER AUTO SHUTOFF MEDI-VAC NONSTERILE LF DISP  (40EA/CA)

## (undated) DEVICE — DRAIN PENROSE STERILE 1/4 X - 18 IN  (25EA/BX)

## (undated) DEVICE — CLIP SM INTNL HRZN TI ESCP LGT - (24EA/PK 25PK/BX)

## (undated) DEVICE — NEPTUNE 4 PORT MANIFOLD - (20/PK)

## (undated) DEVICE — PROTECTOR ULNA NERVE - (36PR/CA)

## (undated) DEVICE — HEAD HOLDER JUNIOR/ADULT

## (undated) DEVICE — GRAFT MESH SEPRAFILM - 10/BX

## (undated) DEVICE — TUBE E-T HI-LO CUFF 7.5MM (10EA/PK)

## (undated) DEVICE — GLOVE BIOGEL SZ 8 SURGICAL PF LTX - (50PR/BX 4BX/CA)

## (undated) DEVICE — Device

## (undated) DEVICE — GLOVE BIOGEL SZ 6.5 SURGICAL PF LTX (50PR/BX 4BX/CA)

## (undated) DEVICE — PACK DAVINCI LOW ANTERIOR RESECTION (1EA/CA)

## (undated) DEVICE — SUTURE 3-0 VICRYL PLUS SH - 27 INCH (36/BX)

## (undated) DEVICE — SUTURE 2-0 ETHILON FS - (36/BX) 18 INCH

## (undated) DEVICE — SUTURE 0 LIGATING REEL VICRYL PLUS (12PK/BX)

## (undated) DEVICE — REDUCER XI STAPLER 12MM TO 8MM (6EA/BX)

## (undated) DEVICE — SEALER VESSEL EXTEND FROM DAVINCI ENERGY (6EA/BX)

## (undated) DEVICE — SET TUBING PNEUMOCLEAR HIGH FLOW SMOKE EVACUATION (10EA/BX)

## (undated) DEVICE — ELECTRODE 850 FOAM ADHESIVE - HYDROGEL RADIOTRNSPRNT (50/PK)

## (undated) DEVICE — RELOAD STAPLER SUREFORM 60 WHITE (12EA/BX)

## (undated) DEVICE — OBTURATOR BLADELESS STANDARD 8MM (6EA/BX)

## (undated) DEVICE — SUCTION INSTRUMENT YANKAUER BULBOUS TIP W/O VENT (50EA/CA)

## (undated) DEVICE — DRAPE ARM  BOX OF 20

## (undated) DEVICE — SET LEADWIRE 5 LEAD BEDSIDE DISPOSABLE ECG (1SET OF 5/EA)

## (undated) DEVICE — KIT 2.25IN COL ILSTM 2 PC DRN - 57MM 2 1/4 INCH THIS IS FOR THE OR ONLY (5/BX)

## (undated) DEVICE — GLOVE BIOGEL INDICATOR SZ 7SURGICAL PF LTX - (50/BX 4BX/CA)

## (undated) DEVICE — MASK ANESTHESIA ADULT  - (100/CA)

## (undated) DEVICE — DRAIN J-VAC 19FR. ROUND - (10/CS)

## (undated) DEVICE — MEDICINE CUP STERILE 2 OZ - (100/CA)

## (undated) DEVICE — GLOVE SZ 6.5 BIOGEL PI MICRO - PF LF (50PR/BX)

## (undated) DEVICE — GOWN WARMING STANDARD FLEX - (30/CA)

## (undated) DEVICE — LIGASURE TISSUE FUSION  - SINGLE USE (6/CA)

## (undated) DEVICE — TRAY CATHETER FOLEY URINE METER W/STATLOCK 350ML (10EA/CA)

## (undated) DEVICE — SUTURE GENERAL

## (undated) DEVICE — GLOVE BIOGEL PI ORTHO SZ 6 1/2 SURGICAL PF LF (40PR/BX)

## (undated) DEVICE — IRRIGATOR SUCTION ENDOWRIST DISPOSABLE OD8 MM (6EA/BX)

## (undated) DEVICE — STAPLE 75MM LINEAR (12EA/BX)

## (undated) DEVICE — GLOVE BIOGEL SZ 7 SURGICAL PF LTX - (50PR/BX 4BX/CA)

## (undated) DEVICE — SEAL CANNULA STAPLER 12 MM (10EA/BX)

## (undated) DEVICE — SODIUM CHL IRRIGATION 0.9% 1000ML (12EA/CA)

## (undated) DEVICE — COVER TIP ENDOWRIST HOT SHEAR - (10EA/BX) DA VINCI

## (undated) DEVICE — SEAL 5MM-8MM UNIVERSAL  BOX OF 10

## (undated) DEVICE — STAPLER SUREFORM 60 12 MM (6EA/BX)

## (undated) DEVICE — KIT ANESTHESIA W/CIRCUIT & 3/LT BAG W/FILTER (20EA/CA)

## (undated) DEVICE — SUTURE 4-0 MONOCRYL PLUS PS-2 - 27 INCH (36/BX)

## (undated) DEVICE — LACTATED RINGERS INJ 1000 ML - (14EA/CA 60CA/PF)

## (undated) DEVICE — GLOVE BIOGEL PI INDICATOR SZ 7.0 SURGICAL PF LF - (50/BX 4BX/CA)

## (undated) DEVICE — SENSOR SPO2 ADULT LNCS ADTX (20/BX) ORDER ITEM #19593

## (undated) DEVICE — SET EXTENSION WITH 2 PORTS (48EA/CA) ***PART #2C8610 IS A SUBSTITUTE*****

## (undated) DEVICE — TUBE NG SALEM SUMP 16FR (50EA/CA)

## (undated) DEVICE — TROCAR 5X100 NON BLADED Z-TH - READ KII (6/BX)

## (undated) DEVICE — STAPLER SKIN DISP - (6/BX 10BX/CA) VISISTAT

## (undated) DEVICE — SUTURE NABSB 2-0 KS 30IN PRLN BLUE (36PK/BX)